# Patient Record
Sex: FEMALE | Race: WHITE | NOT HISPANIC OR LATINO | Employment: OTHER | URBAN - METROPOLITAN AREA
[De-identification: names, ages, dates, MRNs, and addresses within clinical notes are randomized per-mention and may not be internally consistent; named-entity substitution may affect disease eponyms.]

---

## 2017-03-20 ENCOUNTER — TELEPHONE (OUTPATIENT)
Dept: FAMILY MEDICINE | Facility: CLINIC | Age: 62
End: 2017-03-20

## 2017-03-20 NOTE — TELEPHONE ENCOUNTER
Panel Management Review      Patient has the following on her problem list: Hypothyroid     Composite cancer screening  Chart review shows that this patient is due/due soon for the following Mammogram  Summary:    Patient is due/failing the following:   MAMMOGRAM    Action needed:   Patient needs referral/order: mammogram    Type of outreach:    Sent letter.    Questions for provider review:    None                                                                                                                                    Iman Gonzalez       Chart routed to none .

## 2017-04-25 ENCOUNTER — RADIANT APPOINTMENT (OUTPATIENT)
Dept: GENERAL RADIOLOGY | Facility: CLINIC | Age: 62
End: 2017-04-25
Attending: NURSE PRACTITIONER
Payer: COMMERCIAL

## 2017-04-25 ENCOUNTER — HOSPITAL ENCOUNTER (OUTPATIENT)
Dept: MAMMOGRAPHY | Facility: CLINIC | Age: 62
Discharge: HOME | End: 2017-04-25
Attending: NURSE PRACTITIONER | Admitting: NURSE PRACTITIONER
Payer: COMMERCIAL

## 2017-04-25 ENCOUNTER — OFFICE VISIT (OUTPATIENT)
Dept: FAMILY MEDICINE | Facility: CLINIC | Age: 62
End: 2017-04-25
Payer: COMMERCIAL

## 2017-04-25 VITALS
BODY MASS INDEX: 37.8 KG/M2 | DIASTOLIC BLOOD PRESSURE: 81 MMHG | TEMPERATURE: 98.3 F | HEART RATE: 73 BPM | SYSTOLIC BLOOD PRESSURE: 121 MMHG | HEIGHT: 64 IN | WEIGHT: 221.4 LBS

## 2017-04-25 DIAGNOSIS — M25.551 BILATERAL HIP PAIN: Primary | ICD-10-CM

## 2017-04-25 DIAGNOSIS — E03.4 HYPOTHYROIDISM DUE TO ACQUIRED ATROPHY OF THYROID: ICD-10-CM

## 2017-04-25 DIAGNOSIS — E55.9 VITAMIN D DEFICIENCY: ICD-10-CM

## 2017-04-25 DIAGNOSIS — Z11.59 NEED FOR HEPATITIS C SCREENING TEST: ICD-10-CM

## 2017-04-25 DIAGNOSIS — M25.552 BILATERAL HIP PAIN: Primary | ICD-10-CM

## 2017-04-25 DIAGNOSIS — Z12.31 VISIT FOR SCREENING MAMMOGRAM: ICD-10-CM

## 2017-04-25 DIAGNOSIS — M25.552 BILATERAL HIP PAIN: ICD-10-CM

## 2017-04-25 DIAGNOSIS — R73.09 ELEVATED GLUCOSE: ICD-10-CM

## 2017-04-25 DIAGNOSIS — M25.551 BILATERAL HIP PAIN: ICD-10-CM

## 2017-04-25 DIAGNOSIS — Z13.6 CARDIOVASCULAR SCREENING; LDL GOAL LESS THAN 160: ICD-10-CM

## 2017-04-25 PROCEDURE — 77063 BREAST TOMOSYNTHESIS BI: CPT

## 2017-04-25 PROCEDURE — 99214 OFFICE O/P EST MOD 30 MIN: CPT | Performed by: NURSE PRACTITIONER

## 2017-04-25 PROCEDURE — 73523 X-RAY EXAM HIPS BI 5/> VIEWS: CPT

## 2017-04-25 NOTE — NURSING NOTE
"Chief Complaint   Patient presents with     Musculoskeletal Problem     Hip Pain       Initial /81  Pulse 73  Temp 98.3  F (36.8  C) (Tympanic)  Ht 5' 3.5\" (1.613 m)  Wt 221 lb 6.4 oz (100.4 kg)  BMI 38.6 kg/m2 Estimated body mass index is 38.6 kg/(m^2) as calculated from the following:    Height as of this encounter: 5' 3.5\" (1.613 m).    Weight as of this encounter: 221 lb 6.4 oz (100.4 kg).  Medication Reconciliation: complete    "

## 2017-04-25 NOTE — PROGRESS NOTES
SUBJECTIVE:                                                    Natalie Holt is a 62 year old female who presents to clinic today for the following health issues:      Joint Pain     Onset: 2 months ago    Description:   Location: left hip and right hip  Character: Gnawing and more pain in left hip then right    Intensity: 5/10    Progression of Symptoms: worse    Accompanying Signs & Symptoms:  Other symptoms: none   History:   Previous similar pain: no       Precipitating factors:   Trauma or overuse: no     Alleviating factors:  Improved by: laying on stomach, frequently changing positions     Therapies Tried and outcome: none    Has been retired for 4 years.  Doesn't do much ,    is going to retire soon      Problem list and histories reviewed & adjusted, as indicated.  Additional history: as documented    Patient Active Problem List   Diagnosis     Generalized anxiety disorder     Post-menopausal bleeding     Abnormal Pap smear     CARDIOVASCULAR SCREENING; LDL GOAL LESS THAN 160     Elevated glucose     24 hour contact handout given     Advanced directives, HC info packet sent 7/31/2012     Other symptoms involving nervous and musculoskeletal systems(976.63)     Tubular adenoma of colon     Hypothyroidism     Past Surgical History:   Procedure Laterality Date     C ANESTH,ANKLE REPLACEMENT      Left ankle, 3 pin inserted.     C ANESTH,ELBOW AREA SURGERY      Nasim inserted into her left elbow.     C FACIAL REPLACEMENT      Repair to the right side of her temple.     C SOLID INSERT,FOAM,HOOKON HARDWARE      Removal of 3 pins from her Left ankle       Social History   Substance Use Topics     Smoking status: Never Smoker     Smokeless tobacco: Never Used      Comment: Social smoker on rare occsions.     Alcohol use Yes      Comment: Occasional beer per week.     Family History   Problem Relation Age of Onset     Gynecology Daughter      Cyst on her ovary         Current Outpatient Prescriptions  "  Medication Sig Dispense Refill     Levothyroxine Sodium 25 MCG CAPS Take 25 mcg by mouth daily 90 capsule 3     cephALEXin (KEFLEX) 500 MG capsule Take 1 capsule (500 mg) by mouth 4 times daily (Patient not taking: Reported on 4/25/2017) 21 capsule 0     Allergies   Allergen Reactions     Nkda [No Known Drug Allergies]      BP Readings from Last 3 Encounters:   04/25/17 121/81   08/30/16 138/80   06/29/16 132/82    Wt Readings from Last 3 Encounters:   04/25/17 221 lb 6.4 oz (100.4 kg)   08/30/16 213 lb 11.2 oz (96.9 kg)   06/29/16 213 lb 3.2 oz (96.7 kg)                    Reviewed and updated as needed this visit by clinical staff       Reviewed and updated as needed this visit by Provider         ROS:  C: NEGATIVE for fever, chills, change in weight  E/M: NEGATIVE for ear, mouth and throat problems  R: NEGATIVE for significant cough or SOB  CV: NEGATIVE for chest pain, palpitations or peripheral edema  MUSCULOSKELETAL: POSITIVE  for joint pain left hip pain.  It is hard to sleep on her left side.  Will flip to her right side and then the right hip will start to hurt.   She will sleep in , then read a book in bed.  Will cook breakfast and then bring the food up to the bedroom .   They eat all of their meals to the bedroom,  Has a coffee pot in her bedroom     Does have a very sedentary life style.  has a lot of back pain    Does have a full exercise room with sauna in their house.    Daughter is getting  next  October  And she wants her to lose weight      OBJECTIVE:                                                    /81  Pulse 73  Temp 98.3  F (36.8  C) (Tympanic)  Ht 5' 3.5\" (1.613 m)  Wt 221 lb 6.4 oz (100.4 kg)  BMI 38.6 kg/m2  Body mass index is 38.6 kg/(m^2).  GENERAL: healthy, alert and no distress  RESP: lungs clear to auscultation - no rales, rhonchi or wheezes  CV: regular rate and rhythm, normal S1 S2, no S3 or S4, no murmur, click or rub, no peripheral edema and peripheral " pulses strong  MS: right hip  Palpation: Tender:   right greater trochanter  Non-tender:  right gluteus medius, right ASIS, right proximal hamstring attachment  Range of Motion:  Right Hip  flexion   full, pain-free, extension  full, pain-free, external rotation  full, pain-free, internal rotation  full, pain-free, adduction  full, pain-free, abduction  full, pain-free  Strength:  full strength  Special tests:  no crepitation/snapping over central inguinal region, no crepitation/snapping over greater trochanter    Left Hip  Palpation: Tender:   left greater trochanter  Non-tender:  left gluteus medius, left ASIS, left iliac crest, left proximal hamstring attachment  Range of Motion:  Left Hip  flexion   decreased, pain-free, extension  full, pain-free, external rotation  full, pain-free, internal rotation  full, pain-free, adduction  full, pain-free, abduction  full, pain-free  Strength:  full strength  Special tests:  no crepitation/snapping over central inguinal region, no crepitation/snapping over greater trochanter, no IT band tightness    Diagnostic Test Results:  Hip x-ray is showing      ASSESSMENT/PLAN:                                                      ASSESSMENT/PLAN:      ICD-10-CM    1. Bilateral hip pain M25.551 [facility], AND CHIR[facility]RACTIC REFERRAL    M25.552 CANCELED: XR Hip Left 2-3 Views     CANCELED: XR Hip Right 2-3 Views   2. Need for hepatitis C screening test Z11.59 Hepatitis C Screen Reflex to HCV RNA Quant and Genotype   3. CARDIOVASCULAR SCREENING; LDL GOAL LESS THAN 160 Z13.6 LDL cholesterol direct   4. Hypothyroidism due to acquired atrophy of thyroid E03.4 TSH   5. Elevated glucose R73.09 Basic metabolic panel  (Ca, Cl, CO2, Creat, Gluc, K, Na, BUN)     Hemoglobin A1c   6. Vitamin D deficiency E55.9 Vitamin D Deficiency       Patient Instructions   There is a new exercise place in the [employer] mall [place]  .  You DO need to  Exercise .  Start to move!!!!!!    For weight loss check  "[employer] This is a free web site for weight loss.     STOP eating your meals in the bedroom  !!!     Talk with your daughter about exercising and make an agreement with her to exercise daily   Or at least 5 days per week.       You are due for  Pap smear     GO to  Physical therapy for the hips.                     BMI:   Estimated body mass index is 38.6 kg/(m^2) as calculated from the following:    Height as of this encounter: 5' 3.5\" (1.613 m).    Weight as of this encounter: 221 lb 6.4 oz (100.4 kg).   Weight management plan: Discussed healthy diet and exercise guidelines and patient will follow up in 6 months in clinic to re-evaluate.      MEDICATIONS:  Continue current medications without change  CONSULTATION/REFERRAL to [facility]   Work on weight loss  Regular exercise  See Patient Instructions    RUBI PARK NP, APRN [facility]    "

## 2017-04-25 NOTE — MR AVS SNAPSHOT
After Visit Summary   4/25/2017    Natalie Holt    MRN: [id number]           Patient Information     Date Of Birth          1955        Visit Information        Provider Department      4/25/2017 2:00 PM Winnie Roldan APRN [facility]        Today's Diagnoses     Bilateral hip pain    -  1    Need for hepatitis C screening test        CARDIOVASCULAR SCREENING; LDL GOAL LESS THAN 160        Hypothyroidism due to acquired atrophy of thyroid        Elevated glucose        Vitamin D deficiency          Care Instructions    There is a new exercise place in the [employer] mall [place]  .  You DO need to  Exercise .  Start to move!!!!!!    For weight loss check our [employer] This is a free web site for weight loss.     STOP eating your meals in the bedroom  !!!     Talk with your daughter about exercising and make an agreement with her to exercise daily   Or at least 5 days per week.       You are due for  Pap smear     GO to  Physical therapy for the hips.                Follow-ups after your visit        Additional Services     [facility], HAND, AND CH[facility]RACTIC REFERRAL       **This order will print in the [facility] Scheduling Office**    Physical Therapy, Hand Therapy and Chiropractic Care are available through:    *[facility]  *[facility]  *[facility]    Call one number to schedule at any of the above locations: [phone].    Your provider has referred you to: Physical Therapy at [facility] or [facility]    Indication/Reason for Referral: Hip Pain  Onset of Illness: months ago   Therapy Orders: Evaluate and Treat  Special Programs: None  Special Request: None    Farheen Zhong      Additional Comments for the Therapist or Chiropractor:     Please be aware that coverage of these services is subject to the terms and limitations of your health insurance plan.  Call member services at your health plan with any benefit or  "coverage questions.      Please bring the following to your appointment:    *Your personal calendar for scheduling future appointments  *Comfortable clothing                  Future tests that were ordered for you today     Open Future Orders        Priority Expected Expires Ordered    MA Screen with Implants Bilateral w/Avery Routine  2018            Who to contact     Normal or non-critical lab and imaging results will be communicated to you by [employer], letter or phone within 4 business days after the clinic has received the results. If you do not hear from us within 7 days, please contact the clinic through [employer] or phone. If you have a critical or abnormal lab result, we will notify you by phone as soon as possible.  Submit refill requests through [employer] or call your pharmacy and they will forward the refill request to us. Please allow 3 business days for your refill to be completed.          If you need to speak with a  for additional information , please call: [phone]           Additional Information About Your Visit        [employer] Information     [employer] lets you send messages to your doctor, view your test results, renew your prescriptions, schedule appointments and more. To sign up, go to [email or web address] . Click on \"Log in\" on the left side of the screen, which will take you to the Welcome page. Then click on \"Sign up Now\" on the right side of the page.     You will be asked to enter the access code listed below, as well as some personal information. Please follow the directions to create your username and password.     Your access code is: [email or web address]  Expires: 2017  3:49 PM     Your access code will  in 90 days. If you need help or a new code, please call your [place] clinic or [phone].        Care EveryWhere ID     This is your Care EveryWhere ID. This could be used by other organizations to access your [place] medical " "records  [id number]        Your Vitals Were     Pulse Temperature Height BMI (Body Mass Index)          73 98.3  F (36.8  C) (Tympanic) 5' 3.5\" (1.613 m) 38.6 kg/m2         Blood Pressure from Last 3 Encounters:   04/25/17 121/81   08/30/16 138/80   06/29/16 132/82    Weight from Last 3 Encounters:   04/25/17 221 lb 6.4 oz (100.4 kg)   08/30/16 213 lb 11.2 oz (96.9 kg)   06/29/16 213 lb 3.2 oz (96.7 kg)              We Performed the Following     Basic metabolic panel  (Ca, Cl, CO2, Creat, Gluc, K, Na, BUN)     Hemoglobin A1c     Hepatitis C Screen Reflex to HCV RNA Quant and Genotype     [facility], HAND, AND CHIROPRACTIC REFERRAL     LDL cholesterol direct     TSH     Vitamin D Deficiency        Primary Care Provider Office Phone # Fax #    LEXX Bridges [facility] [phone]       [address] [address]        Thank you!     Thank you for choosing [facility]  for your care. Our goal is always to provide you with excellent care. Hearing back from our patients is one way we can continue to improve our services. Please take a few minutes to complete the written survey that you may receive in the mail after your visit with us. Thank you!             Your Updated Medication List - Protect others around you: Learn how to safely use, store and throw away your medicines at www.disposemymeds.org.          This list is accurate as of: 4/25/17  3:50 PM.  Always use your most recent med list.                   Brand Name Dispense Instructions for use    cephALEXin 500 MG capsule    KEFLEX    21 capsule    Take 1 capsule (500 mg) by mouth 4 times daily       Levothyroxine Sodium 25 MCG Caps     90 capsule    Take 25 mcg by mouth daily         "

## 2017-06-01 DIAGNOSIS — E03.4 HYPOTHYROIDISM DUE TO ACQUIRED ATROPHY OF THYROID: ICD-10-CM

## 2017-06-01 NOTE — TELEPHONE ENCOUNTER
Levothyroxine 25mcg     Last Written Prescription Date: 05/17/2016 #90 x 3  Last filled 02/12/2016  Last Office Visit with G, P or [facility] prescribing provider: 04/25/2017 DEXTER Roldan        TSH   Date Value Ref Range Status   05/17/2016 1.68 0.40 - 4.00 mU/L Final

## 2017-06-02 RX ORDER — LEVOTHYROXINE SODIUM 25 UG/1
25 TABLET ORAL DAILY
Qty: 90 TABLET | Refills: 0 | Status: SHIPPED | OUTPATIENT
Start: 2017-06-02 | End: 2017-09-20

## 2017-06-02 NOTE — TELEPHONE ENCOUNTER
Routing refill request to provider for review/approval because:  Labs not current:  TSH    Luanne Larson RN

## 2017-06-28 DIAGNOSIS — Z11.59 NEED FOR HEPATITIS C SCREENING TEST: ICD-10-CM

## 2017-06-28 DIAGNOSIS — Z13.6 CARDIOVASCULAR SCREENING; LDL GOAL LESS THAN 160: ICD-10-CM

## 2017-06-28 DIAGNOSIS — E55.9 VITAMIN D DEFICIENCY: ICD-10-CM

## 2017-06-28 DIAGNOSIS — R73.09 ELEVATED GLUCOSE: ICD-10-CM

## 2017-06-28 DIAGNOSIS — E03.4 HYPOTHYROIDISM DUE TO ACQUIRED ATROPHY OF THYROID: ICD-10-CM

## 2017-06-28 LAB
ANION GAP SERPL CALCULATED.3IONS-SCNC: 8 MMOL/L (ref 3–14)
BUN SERPL-MCNC: 16 MG/DL (ref 7–30)
CALCIUM SERPL-MCNC: 8.7 MG/DL (ref 8.5–10.1)
CHLORIDE SERPL-SCNC: 102 MMOL/L (ref 94–109)
CHOLEST SERPL-MCNC: 179 MG/DL
CO2 SERPL-SCNC: 25 MMOL/L (ref 20–32)
CREAT SERPL-MCNC: 0.58 MG/DL (ref 0.52–1.04)
DEPRECATED CALCIDIOL+CALCIFEROL SERPL-MC: 27 UG/L (ref 20–75)
GFR SERPL CREATININE-BSD FRML MDRD: ABNORMAL ML/MIN/1.7M2
GLUCOSE SERPL-MCNC: 104 MG/DL (ref 70–99)
HBA1C MFR BLD: 5.5 % (ref 4.3–6)
HCV AB SERPL QL IA: NORMAL
HDLC SERPL-MCNC: 67 MG/DL
LDLC SERPL CALC-MCNC: 98 MG/DL
NONHDLC SERPL-MCNC: 112 MG/DL
POTASSIUM SERPL-SCNC: 3.7 MMOL/L (ref 3.4–5.3)
SODIUM SERPL-SCNC: 135 MMOL/L (ref 133–144)
TRIGL SERPL-MCNC: 68 MG/DL
TSH SERPL DL<=0.05 MIU/L-ACNC: 2.42 MU/L (ref 0.4–4)

## 2017-06-28 PROCEDURE — 82306 VITAMIN D 25 HYDROXY: CPT | Performed by: NURSE PRACTITIONER

## 2017-06-28 PROCEDURE — 80061 LIPID PANEL: CPT | Performed by: NURSE PRACTITIONER

## 2017-06-28 PROCEDURE — 80048 BASIC METABOLIC PNL TOTAL CA: CPT | Performed by: NURSE PRACTITIONER

## 2017-06-28 PROCEDURE — 83036 HEMOGLOBIN GLYCOSYLATED A1C: CPT | Performed by: NURSE PRACTITIONER

## 2017-06-28 PROCEDURE — 84443 ASSAY THYROID STIM HORMONE: CPT | Performed by: NURSE PRACTITIONER

## 2017-06-28 PROCEDURE — 36415 COLL VENOUS BLD VENIPUNCTURE: CPT | Performed by: NURSE PRACTITIONER

## 2017-06-28 PROCEDURE — 86803 HEPATITIS C AB TEST: CPT | Performed by: NURSE PRACTITIONER

## 2017-07-15 ENCOUNTER — HEALTH MAINTENANCE LETTER (OUTPATIENT)
Age: 62
End: 2017-07-15

## 2017-09-20 ENCOUNTER — TELEPHONE (OUTPATIENT)
Dept: FAMILY MEDICINE | Facility: CLINIC | Age: 62
End: 2017-09-20

## 2017-09-20 DIAGNOSIS — E03.4 HYPOTHYROIDISM DUE TO ACQUIRED ATROPHY OF THYROID: ICD-10-CM

## 2017-09-20 RX ORDER — LEVOTHYROXINE SODIUM 25 UG/1
25 TABLET ORAL DAILY
Qty: 90 TABLET | Refills: 2 | Status: SHIPPED | OUTPATIENT
Start: 2017-09-20 | End: 2018-02-19

## 2017-09-20 NOTE — TELEPHONE ENCOUNTER
Pt called requesting refill of Synthroid to [facility] pharmacy.    Thank you,    Sara Vigil, [place]

## 2017-09-20 NOTE — TELEPHONE ENCOUNTER
Prescription approved per [facility] Protocol or patient Primary care provider (PCP)  ANAIS Christian RN/Andrew Silva

## 2017-09-22 ENCOUNTER — OFFICE VISIT (OUTPATIENT)
Dept: FAMILY MEDICINE | Facility: CLINIC | Age: 62
End: 2017-09-22
Payer: COMMERCIAL

## 2017-09-22 VITALS
HEART RATE: 84 BPM | WEIGHT: 234.25 LBS | SYSTOLIC BLOOD PRESSURE: 110 MMHG | TEMPERATURE: 98.4 F | DIASTOLIC BLOOD PRESSURE: 68 MMHG | HEIGHT: 63 IN | BODY MASS INDEX: 41.5 KG/M2

## 2017-09-22 DIAGNOSIS — F22 PARANOID DISORDER (H): ICD-10-CM

## 2017-09-22 DIAGNOSIS — Z86.0100 HISTORY OF COLONIC POLYPS: ICD-10-CM

## 2017-09-22 DIAGNOSIS — Z11.51 SCREENING FOR HUMAN PAPILLOMAVIRUS: ICD-10-CM

## 2017-09-22 DIAGNOSIS — Z01.419 ENCOUNTER FOR GYNECOLOGICAL EXAMINATION WITHOUT ABNORMAL FINDING: Primary | ICD-10-CM

## 2017-09-22 DIAGNOSIS — E55.9 VITAMIN D DEFICIENCY: ICD-10-CM

## 2017-09-22 DIAGNOSIS — F43.22 ADJUSTMENT DISORDER WITH ANXIOUS MOOD: ICD-10-CM

## 2017-09-22 DIAGNOSIS — Z12.11 SPECIAL SCREENING FOR MALIGNANT NEOPLASMS, COLON: ICD-10-CM

## 2017-09-22 DIAGNOSIS — E03.4 HYPOTHYROIDISM DUE TO ACQUIRED ATROPHY OF THYROID: ICD-10-CM

## 2017-09-22 DIAGNOSIS — E66.01 MORBID OBESITY DUE TO EXCESS CALORIES (H): ICD-10-CM

## 2017-09-22 PROBLEM — Z71.89 ADVANCED DIRECTIVES, COUNSELING/DISCUSSION: Status: ACTIVE | Noted: 2017-09-22

## 2017-09-22 PROCEDURE — 99396 PREV VISIT EST AGE 40-64: CPT | Performed by: NURSE PRACTITIONER

## 2017-09-22 PROCEDURE — G0145 SCR C/V CYTO,THINLAYER,RESCR: HCPCS | Performed by: NURSE PRACTITIONER

## 2017-09-22 PROCEDURE — 87624 HPV HI-RISK TYP POOLED RSLT: CPT | Performed by: NURSE PRACTITIONER

## 2017-09-22 NOTE — LETTER
October 2, 2017    Natalie Holt  [address] [address]    Dear Natalie,  We are happy to inform you that your PAP smear result from 9/22/17 is normal.  We are now able to do a follow up test on PAP smears. The DNA test is for HPV (Human Papilloma Virus). Cervical cancer is closely linked with certain types of HPV. Your result showed no evidence of high risk HPV.  Therefore we recommend you return in 3 years for your next pap smear and HPV test.  You will still need to return to the clinic every year for an annual exam and other preventive tests.  Please contact the clinic at [phone] with any questions.  Sincerely,    RUBI PARK NP, APRN CNP/rlm

## 2017-09-22 NOTE — PROGRESS NOTES
SUBJECTIVE:   CC: Natalie Holt is an 62 year old woman who presents for preventive health visit.     Healthy Habits:    Do you get at least three servings of calcium containing foods daily (dairy, green leafy vegetables, etc.)? yes    Amount of exercise or daily activities, outside of work: None    Problems taking medications regularly No    Medication side effects: No    Have you had an eye exam in the past two years? no    Do you see a dentist twice per year? yes    Do you have sleep apnea, excessive snoring or daytime drowsiness?Her  does tell her that she snores a lot.          Depression or Anxiety - New Diagnosis   Duration of complaint: has been ongoing    Description of symptoms: Paranoia  Intensity:  moderate  Accompanying signs and symptoms: none  Aggravating factors: She states that she is just paranoid and feels uneasy.  Alleviating factors: nothing  Other Therapies tried: None, and she doesn't want medication, she just wanted to address it.      Today's PHQ-2 Score:   PHQ-2 ( 1999 [employer]) 9/22/2017 8/30/2016   Q1: Little interest or pleasure in doing things 0 0   Q2: Feeling down, depressed or hopeless 1 0   PHQ-2 Score 1 0         Abuse: Current or Past(Physical, Sexual or Emotional)- No  Do you feel safe in your environment - Yes    Social History   Substance Use Topics     Smoking status: Never Smoker     Smokeless tobacco: Never Used      Comment: Social smoker on rare occsions.     Alcohol use Yes      Comment: Occasional beer per week.     The patient does not drink >3 drinks per day nor >7 drinks per week.    Reviewed orders with patient.  Reviewed health maintenance and updated orders accordingly - Yes  Labs reviewed in EPIC  BP Readings from Last 3 Encounters:   09/22/17 110/68   04/25/17 121/81   08/30/16 138/80    Wt Readings from Last 3 Encounters:   09/22/17 234 lb 4 oz (106.3 kg)   04/25/17 221 lb 6.4 oz (100.4 kg)   08/30/16 213 lb 11.2 oz (96.9 kg)                  Patient  Active Problem List   Diagnosis     Post-menopausal bleeding     Abnormal Pap smear     CARDIOVASCULAR SCREENING; LDL GOAL LESS THAN 160     24 hour contact handout given     Nerv/musculskel sym NEC     Tubular adenoma of colon     Hypothyroidism due to acquired atrophy of thyroid     Elevated glucose     Vitamin D deficiency     Advanced directives, counseling/discussion     Past Surgical History:   Procedure Laterality Date     C ANESTH,ANKLE REPLACEMENT      Left ankle, 3 pin inserted.     C ANESTH,ELBOW AREA SURGERY      Nasim inserted into her left elbow.     C FACIAL REPLACEMENT      Repair to the right side of her temple.     C SOLID INSERT,FOAM,HOOKON HARDWARE      Removal of 3 pins from her Left ankle       Social History   Substance Use Topics     Smoking status: Never Smoker     Smokeless tobacco: Never Used      Comment: Social smoker on rare occsions.     Alcohol use Yes      Comment: Occasional beer per week.     Family History   Problem Relation Age of Onset     Gynecology Daughter      Cyst on her ovary         Current Outpatient Prescriptions   Medication Sig Dispense Refill     levothyroxine (SYNTHROID/LEVOTHROID) 25 MCG tablet Take 1 tablet (25 mcg) by mouth daily 90 tablet 2     cephALEXin (KEFLEX) 500 MG capsule Take 1 capsule (500 mg) by mouth 4 times daily (Patient not taking: Reported on 4/25/2017) 21 capsule 0     Levothyroxine Sodium 25 MCG CAPS Take 25 mcg by mouth daily 90 capsule 3     Allergies   Allergen Reactions     Nkda [No Known Drug Allergies]            Patient over age 50, mutual decision to screen reflected in health maintenance.      Pertinent mammograms are reviewed under the imaging tab.  History of abnormal Pap smear: NO - age 30- 65 PAP every 3 years recommended    Reviewed and updated as needed this visit by clinical staff  Tobacco  Allergies  Meds  Med Hx  Surg Hx  Fam Hx  Soc Hx        Reviewed and updated as needed this visit by Provider  Tobacco  Allergies  Meds  " Med Hx  Surg Hx  Fam Hx  Soc Hx       Past Medical History:   Diagnosis Date     Anxiety      CAD (coronary artery disease) 4/29/2011     Generalised anxiety disorder 1/12/2010     Hypothyroidism      Post-menopausal bleeding 1/22/2010        ROS:  C: NEGATIVE for fever, chills, change in weight  I: NEGATIVE for worrisome rashes, moles or lesions  E: NEGATIVE for vision changes or irritation  ENT: NEGATIVE for ear, mouth and throat problems  R: NEGATIVE for significant cough or SOB  B: NEGATIVE for masses, tenderness or discharge  CV: NEGATIVE for chest pain, palpitations or peripheral edema  GI: NEGATIVE for nausea, abdominal pain, heartburn, or change in bowel habits  : NEGATIVE for unusual urinary or vaginal symptoms. No vaginal bleeding.  M: NEGATIVE for significant arthralgias or myalgia  N: NEGATIVE for weakness, dizziness or paresthesias  E: NEGATIVE for temperature intolerance, skin/hair changes  P: POSITIVE for changes in mood or affect , is paranoid ,wants to stay at home .  Feels that is she goes out side of the home that people are out to get her. People are trying to set her up.   Is always looking at how people can get to her.   Her  is selling his business and she it feeling that people are trying to set them up - but her  doesn't feel that same way     OBJECTIVE:   /68 (BP Location: Left arm, Patient Position: Sitting, Cuff Size: Adult Large)  Pulse 84  Temp 98.4  F (36.9  C) (Oral)  Ht 5' 3.25\" (1.607 m)  Wt 234 lb 4 oz (106.3 kg)  BMI 41.17 kg/m2  EXAM:  GENERAL APPEARANCE: healthy, alert and no distress  EYES: Eyes grossly normal to inspection, PERRL and conjunctivae and sclerae normal  HENT: ear canals and TM's normal, nose and mouth without ulcers or lesions, oropharynx clear and oral mucous membranes moist  NECK: no adenopathy, no asymmetry, masses, or scars and thyroid normal to palpation  RESP: lungs clear to auscultation - no rales, rhonchi or wheezes  BREAST: " normal without masses, tenderness or nipple discharge and no palpable axillary masses or adenopathy  CV: regular rate and rhythm, normal S1 S2, no S3 or S4, no murmur, click or rub, no peripheral edema and peripheral pulses strong  ABDOMEN: soft, nontender, no hepatosplenomegaly, no masses and bowel sounds normal   (female): normal female external genitalia, normal urethral meatus, vaginal mucosal atrophy noted, normal cervix, adnexae, and uterus without masses or abnormal discharge  MS: no musculoskeletal defects are noted and gait is age appropriate without ataxia  SKIN: no suspicious lesions or rashes  NEURO: Normal strength and tone, sensory exam grossly normal, mentation intact and speech normal  PSYCH: mentation appears normal and affect normal/bright    ASSESSMENT/PLAN:     ASSESSMENT/PLAN:      ICD-10-CM    1. Encounter for gynecological examination without abnormal finding Z01.419 Pap imaged thin layer screen with HPV - recommended age 30 - 65     HPV High Risk Types DNA Cervical   2. Screening for human papillomavirus Z11.51 Pap imaged thin layer screen with HPV - recommended age 30 - 65     HPV High Risk Types DNA Cervical   3. Paranoid disorder (H) F22 PSYCHOLOGY REFERRAL     MENTAL HEALTH REFERRAL   4. Adjustment disorder with anxious mood F43.22 PSYCHOLOGY REFERRAL     MENTAL HEALTH REFERRAL   5. Hypothyroidism due to acquired atrophy of thyroid E03.4    6. Vitamin D deficiency E55.9    7. Special screening for malignant neoplasms, colon Z12.11 GASTROENTEROLOGY ADULT REF CONSULT ONLY   8. History of colonic polyps Z86.010 GASTROENTEROLOGY ADULT REF CONSULT ONLY   9. Morbid obesity due to excess calories (H) E66.01        Patient Instructions     Preventive Health Recommendations  Female Ages 50 - 64    Yearly exam: See your health care provider every year in order to  o Review health changes.   o Discuss preventive care.    o Review your medicines if your doctor has prescribed any.      Get a Pap  test every three years (unless you have an abnormal result and your provider advises testing more often).    If you get Pap tests with HPV test, you only need to test every 5 years, unless you have an abnormal result.     You do not need a Pap test if your uterus was removed (hysterectomy) and you have not had cancer.    You should be tested each year for STDs (sexually transmitted diseases) if you're at risk.     Have a mammogram every 1 to 2 years.    Have a colonoscopy at age 50, or have a yearly FIT test (stool test). These exams screen for colon cancer.      Have a cholesterol test every 5 years, or more often if advised.    Have a diabetes test (fasting glucose) every three years. If you are at risk for diabetes, you should have this test more often.     If you are at risk for osteoporosis (brittle bone disease), think about having a bone density scan (DEXA).    Shots: Get a flu shot each year. Get a tetanus shot every 10 years.    Nutrition:     Eat at least 5 servings of fruits and vegetables each day.    Eat whole-grain bread, whole-wheat pasta and brown rice instead of white grains and rice.    Talk to your provider about Calcium and Vitamin D.     Lifestyle    Exercise at least 150 minutes a week (30 minutes a day, 5 days a week). This will help you control your weight and prevent disease.    Limit alcohol to one drink per day.    No smoking.     Wear sunscreen to prevent skin cancer.     See your dentist every six months for an exam and cleaning.    See your eye doctor every 1 to 2 years.      Please go to a counselor/psy for the paranoid thoughts.     They will help you to  Enjoy life and control the paranoid thoughts     Stay well hydrated - urine should be clear.      Exercise  30-60 min daily     For weight loss check [employer] This is a free web site for weight loss.     Your labs are all very good.      Get your colonoscopy done   You will need to call for your appointment  "            }    COUNSELING:   Reviewed preventive health counseling, as reflected in patient instructions       Regular exercise       Healthy diet/nutrition       Vision screening       Immunizations    Declined: Influenza              Osteoporosis Prevention/Bone Health       Colon cancer screening       Advance Care Planning           reports that she has never smoked. She has never used smokeless tobacco.    Estimated body mass index is 41.17 kg/(m^2) as calculated from the following:    Height as of this encounter: 5' 3.25\" (1.607 m).    Weight as of this encounter: 234 lb 4 oz (106.3 kg).   Weight management plan: Discussed healthy diet and exercise guidelines and patient will follow up in 6 months in clinic to re-evaluate.    Counseling Resources:  ATP IV Guidelines  Pooled Cohorts Equation Calculator  Breast Cancer Risk Calculator  FRAX Risk Assessment  ICSI Preventive Guidelines  Dietary Guidelines for Americans, 2010  USDA's MyPlate  ASA Prophylaxis  Lung CA Screening    RUBI PARK NP, APRN [facility]  "

## 2017-09-22 NOTE — NURSING NOTE
"Chief Complaint   Patient presents with     Physical       Initial /68 (BP Location: Left arm, Patient Position: Sitting, Cuff Size: Adult Large)  Pulse 84  Temp 98.4  F (36.9  C) (Oral)  Ht 5' 3.25\" (1.607 m)  Wt 234 lb 4 oz (106.3 kg)  BMI 41.17 kg/m2 Estimated body mass index is 41.17 kg/(m^2) as calculated from the following:    Height as of this encounter: 5' 3.25\" (1.607 m).    Weight as of this encounter: 234 lb 4 oz (106.3 kg).  Medication Reconciliation: complete     SMA Alexandru      "

## 2017-09-22 NOTE — MR AVS SNAPSHOT
After Visit Summary   9/22/2017    Natalie Holt    MRN: [id number]           Patient Information     Date Of Birth          1955        Visit Information        Provider Department      9/22/2017 10:20 AM Winnie Roldan APRN [facility]        Today's Diagnoses     Encounter for gynecological examination without abnormal finding    -  1    Screening for human papillomavirus        Paranoid disorder (H)        Adjustment disorder with anxious mood        Hypothyroidism due to acquired atrophy of thyroid        Vitamin D deficiency        Special screening for malignant neoplasms, colon        History of colonic polyps          Care Instructions      Preventive Health Recommendations  Female Ages 50 - 64    Yearly exam: See your health care provider every year in order to  o Review health changes.   o Discuss preventive care.    o Review your medicines if your doctor has prescribed any.      Get a Pap test every three years (unless you have an abnormal result and your provider advises testing more often).    If you get Pap tests with HPV test, you only need to test every 5 years, unless you have an abnormal result.     You do not need a Pap test if your uterus was removed (hysterectomy) and you have not had cancer.    You should be tested each year for STDs (sexually transmitted diseases) if you're at risk.     Have a mammogram every 1 to 2 years.    Have a colonoscopy at age 50, or have a yearly FIT test (stool test). These exams screen for colon cancer.      Have a cholesterol test every 5 years, or more often if advised.    Have a diabetes test (fasting glucose) every three years. If you are at risk for diabetes, you should have this test more often.     If you are at risk for osteoporosis (brittle bone disease), think about having a bone density scan (DEXA).    Shots: Get a flu shot each year. Get a tetanus shot every 10 years.    Nutrition:     Eat at least 5  servings of fruits and vegetables each day.    Eat whole-grain bread, whole-wheat pasta and brown rice instead of white grains and rice.    Talk to your provider about Calcium and Vitamin D.     Lifestyle    Exercise at least 150 minutes a week (30 minutes a day, 5 days a week). This will help you control your weight and prevent disease.    Limit alcohol to one drink per day.    No smoking.     Wear sunscreen to prevent skin cancer.     See your dentist every six months for an exam and cleaning.    See your eye doctor every 1 to 2 years.      Please go to a counselor/psy for the paranoid thoughts.     They will help you to  Enjoy life and control the paranoid thoughts     Stay well hydrated - urine should be clear.      Exercise  30-60 min daily     For weight loss check [employer] This is a free web site for weight loss.     Your labs are all very good.      Get your colonoscopy done   You will need to call for your appointment               Follow-ups after your visit        Additional Services     GASTROENTEROLOGY ADULT REF CONSULT ONLY       Preferred Location: [facility] [phone] and  [id number] [place] [address]  [phone]        Please be aware that coverage of these services is subject to the terms and limitations of your health insurance plan.  Call member services at your health plan with any benefit or coverage questions.  Any procedures must be performed at a [facility] OR coordinated by your clinic's referral office.    Please bring the following with you to your appointment:    (1) Any X-Rays, CTs or MRIs which have been performed.  Contact the facility where they were done to arrange for  prior to your scheduled appointment.    (2) List of current medications   (3) This referral request   (4) Any documents/labs given to you for this referral            MENTAL HEALTH REFERRAL       Your provider has referred  you to: [facility]: Psychiatry Clinic [facility] [phone]   [email or web address]    All scheduling is subject to the client's specific insurance plan & benefits, provider/location availability, and provider clinical specialities.  Please arrive 15 minutes early for your first appointment and bring your completed paperwork.    Please be aware that coverage of these services is subject to the terms and limitations of your health insurance plan.  Call member services at your health plan with any benefit or coverage questions.            PSYCHOLOGY REFERRAL       Your provider has referred you to:  [facility]    [address] [place] [address]  [phone]      Please be aware that coverage of these services is subject to the terms and limitations of your health insurance plan.  Call member services at your health plan with any benefit or coverage questions.      Please bring the following to your appointment:    >>   Any x-rays, CTs or MRIs which have been performed.  Contact the facility where they were done to arrange for  prior to your scheduled appointment.   >>   List of current medications   >>   This referral request   >>   Any documents/labs given to you for this referral                  Who to contact     Normal or non-critical lab and imaging results will be communicated to you by MyChart, letter or phone within 4 business days after the clinic has received the results. If you do not hear from us within 7 days, please contact the clinic through MyChart or phone. If you have a critical or abnormal lab result, we will notify you by phone as soon as possible.  Submit refill requests through [employer] or call your pharmacy and they will forward the refill request to us. Please allow 3 business days for your refill to be completed.          If you need to speak with a  for additional information , please call:  "[phone]           Additional Information About Your Visit        First [employer] Information     [facility] lets you send messages to your doctor, view your test results, renew your prescriptions, schedule appointments and more. To sign up, go to [email or web address] . Click on \"Log in\" on the left side of the screen, which will take you to the Welcome page. Then click on \"Sign up Now\" on the right side of the page.     You will be asked to enter the access code listed below, as well as some personal information. Please follow the directions to create your username and password.     Your access code is: [email or web address]  Expires: 2017 11:12 AM     Your access code will  in 90 days. If you need help or a new code, please call your [facility] clinic or [phone].        Care EveryWhere ID     This is your [facility] EveryWhere ID. This could be used by other organizations to access your [facility] medical records  [id number]        Your Vitals Were     Pulse Temperature Height BMI (Body Mass Index)          84 98.4  F (36.9  C) (Oral) 5' 3.25\" (1.607 m) 41.17 kg/m2         Blood Pressure from Last 3 Encounters:   17 110/68   17 121/81   16 138/80    Weight from Last 3 Encounters:   17 234 lb 4 oz (106.3 kg)   17 221 lb 6.4 oz (100.4 kg)   16 213 lb 11.2 oz (96.9 kg)              We Performed the Following     GASTROENTEROLOGY ADULT REF CONSULT ONLY     HPV High Risk Types DNA Cervical     MENTAL HEALTH REFERRAL     Pap imaged thin layer screen with HPV - recommended age 30 - 65     PSYCHOLOGY REFERRAL        Primary Care Provider Office Phone # Fax #    LEXX Bridges [facility] [phone] [address] [address]        Equal Access to Services     KATLYN MORAES : Kosta Johnson, steve martinez, michael bergeron. [facility] [phone].    ATENCIÓN: Si pam lam, " tiene a hanna disposición servicios gratuitos de asistencia lingüística. Parmjit nguyen [phone].    We comply with applicable federal civil rights laws and [place] laws. We do not discriminate on the basis of race, color, national origin, age, disability sex, sexual orientation or gender identity.            Thank you!     Thank you for choosing [facility]  for your care. Our goal is always to provide you with excellent care. Hearing back from our patients is one way we can continue to improve our services. Please take a few minutes to complete the written survey that you may receive in the mail after your visit with us. Thank you!             Your Updated Medication List - Protect others around you: Learn how to safely use, store and throw away your medicines at www.disposemymeds.org.          This list is accurate as of: 9/22/17 11:14 AM.  Always use your most recent med list.                   Brand Name Dispense Instructions for use Diagnosis    levothyroxine 25 MCG tablet    SYNTHROID/LEVOTHROID    90 tablet    Take 1 tablet (25 mcg) by mouth daily    Hypothyroidism due to acquired atrophy of thyroid

## 2017-09-22 NOTE — PATIENT INSTRUCTIONS
Preventive Health Recommendations  Female Ages 50 - 64    Yearly exam: See your health care provider every year in order to  o Review health changes.   o Discuss preventive care.    o Review your medicines if your doctor has prescribed any.      Get a Pap test every three years (unless you have an abnormal result and your provider advises testing more often).    If you get Pap tests with HPV test, you only need to test every 5 years, unless you have an abnormal result.     You do not need a Pap test if your uterus was removed (hysterectomy) and you have not had cancer.    You should be tested each year for STDs (sexually transmitted diseases) if you're at risk.     Have a mammogram every 1 to 2 years.    Have a colonoscopy at age 50, or have a yearly FIT test (stool test). These exams screen for colon cancer.      Have a cholesterol test every 5 years, or more often if advised.    Have a diabetes test (fasting glucose) every three years. If you are at risk for diabetes, you should have this test more often.     If you are at risk for osteoporosis (brittle bone disease), think about having a bone density scan (DEXA).    Shots: Get a flu shot each year. Get a tetanus shot every 10 years.    Nutrition:     Eat at least 5 servings of fruits and vegetables each day.    Eat whole-grain bread, whole-wheat pasta and brown rice instead of white grains and rice.    Talk to your provider about Calcium and Vitamin D.     Lifestyle    Exercise at least 150 minutes a week (30 minutes a day, 5 days a week). This will help you control your weight and prevent disease.    Limit alcohol to one drink per day.    No smoking.     Wear sunscreen to prevent skin cancer.     See your dentist every six months for an exam and cleaning.    See your eye doctor every 1 to 2 years.      Please go to a counselor/psy for the paranoid thoughts.     They will help you to  Enjoy life and control the paranoid thoughts     Stay well hydrated - urine  should be clear.      Exercise  30-60 min daily     For weight loss check our [email or web address] This is a free web site for weight loss.     Your labs are all very good.      Get your colonoscopy done   You will need to call for your appointment

## 2017-09-26 LAB
COPATH REPORT: NORMAL
PAP: NORMAL

## 2017-09-28 LAB
FINAL DIAGNOSIS: NORMAL
HPV HR 12 DNA CVX QL NAA+PROBE: NEGATIVE
HPV16 DNA SPEC QL NAA+PROBE: NEGATIVE
HPV18 DNA SPEC QL NAA+PROBE: NEGATIVE
SPECIMEN DESCRIPTION: NORMAL

## 2017-10-21 ENCOUNTER — HEALTH MAINTENANCE LETTER (OUTPATIENT)
Age: 62
End: 2017-10-21

## 2017-11-22 ENCOUNTER — HOSPITAL ENCOUNTER (EMERGENCY)
Facility: CLINIC | Age: 62
Discharge: HOME | End: 2017-11-22
Attending: PHYSICIAN ASSISTANT | Admitting: PHYSICIAN ASSISTANT
Payer: COMMERCIAL

## 2017-11-22 VITALS — OXYGEN SATURATION: 97 % | SYSTOLIC BLOOD PRESSURE: 150 MMHG | HEART RATE: 79 BPM | DIASTOLIC BLOOD PRESSURE: 95 MMHG

## 2017-11-22 DIAGNOSIS — S61.011A THUMB LACERATION, RIGHT, INITIAL ENCOUNTER: ICD-10-CM

## 2017-11-22 PROCEDURE — 99213 OFFICE O/P EST LOW 20 MIN: CPT | Mod: 25 | Performed by: PHYSICIAN ASSISTANT

## 2017-11-22 PROCEDURE — 12001 RPR S/N/AX/GEN/TRNK 2.5CM/<: CPT | Performed by: PHYSICIAN ASSISTANT

## 2017-11-22 PROCEDURE — 99213 OFFICE O/P EST LOW 20 MIN: CPT

## 2017-11-22 PROCEDURE — 12001 RPR S/N/AX/GEN/TRNK 2.5CM/<: CPT

## 2017-11-22 RX ORDER — LIDOCAINE HYDROCHLORIDE 10 MG/ML
INJECTION, SOLUTION INFILTRATION; PERINEURAL
Status: DISCONTINUED
Start: 2017-11-22 | End: 2017-11-22 | Stop reason: HOSPADM

## 2017-11-22 ASSESSMENT — ENCOUNTER SYMPTOMS
COLOR CHANGE: 0
WEAKNESS: 0
NUMBNESS: 1
MYALGIAS: 0
FEVER: 0
SHORTNESS OF BREATH: 0
ARTHRALGIAS: 0
WOUND: 1
DIZZINESS: 0
LIGHT-HEADEDNESS: 0

## 2017-11-22 NOTE — ED PROVIDER NOTES
History     Chief Complaint   Patient presents with     Laceration     HPI  Natalie Holt is a 62 year old female who presents to the Urgent Care for treatment and evaluation of a laceration of the lateral right thumb obtained at approximately 10:00am today. She states she was reaching to clean out a cabinet and cut her thumb on a sharp  blade. Reports immediate pain and bleeding. Denies loss of consciousness, paraesthesias, weakness, fevers, or copious amount of blood loss. Immediate compression applied to the wound with no irrigation or topical antibiotics applied. No medications taken post laceration. Denies current use of aspirin or blood thinners. Pt is right hand dominant.     Problem List:    Patient Active Problem List    Diagnosis Date Noted     Advanced directives, counseling/discussion 09/22/2017     Priority: Medium     Advance Care Planning 9/22/2017: ACP Review of Chart / Resources Provided:  Reviewed chart for advance care plan.  Natalie Holt has been provided information and resources to begin or update their advance care plan.  Added by Jackelyn Shine             History of colonic polyps 09/22/2017     Priority: Medium     Morbid obesity due to excess calories (H) 09/22/2017     Priority: Medium     Hypothyroidism due to acquired atrophy of thyroid 04/25/2017     Priority: Medium     Elevated glucose 04/25/2017     Priority: Medium     Vitamin D deficiency 04/25/2017     Priority: Medium     Tubular adenoma of colon 08/21/2012     Priority: Medium     Tubular adenoma on colonoscopy on 8-2012. Recommend next colonoscopy in 8-2017        Other symptoms involving nervous and musculoskeletal systems(781.99) 08/10/2012     Priority: Medium     CARDIOVASCULAR SCREENING; LDL GOAL LESS THAN 160 05/09/2010     Priority: Medium     Abnormal Pap smear 03/02/2010     Priority: Medium     Endometrial cells on most recent Pap smear. Endometrial biopsy recommended.       Post-menopausal  bleeding 01/22/2010     Priority: Medium     24 hour contact handout given 07/27/2012     Priority: Low     EMERGENCY CARE PLAN  Presenting Problem Signs and Symptoms Treatment Plan    Questions or conerns during clinic hours    I will call the clinic directly     Questions or conerns outside clinic hours    I will call the 24 hour nurse line at [phone]    Patient needs to schedule an appointment    I will call the 24 hour scheduling team at [phone] or clinic directly    Same day treatment     I will call the clinic first, nurse line if after hours, urgent care and express care if needed                                    Past Medical History:    Past Medical History:   Diagnosis Date     Anxiety      CAD (coronary artery disease) 4/29/2011     Generalised anxiety disorder 1/12/2010     Hypothyroidism      Post-menopausal bleeding 1/22/2010       Past Surgical History:    Past Surgical History:   Procedure Laterality Date     C ANESTH,ANKLE REPLACEMENT      Left ankle, 3 pin inserted.     C ANESTH,ELBOW AREA SURGERY      Nasim inserted into her left elbow.     C FACIAL REPLACEMENT      Repair to the right side of her temple.     C SOLID INSERT,FOAM,HOOKON HARDWARE      Removal of 3 pins from her Left ankle       Family History:    Family History   Problem Relation Age of Onset     Gynecology Daughter      Cyst on her ovary       Social History:  Marital Status:   [2]  Social History   Substance Use Topics     Smoking status: Never Smoker     Smokeless tobacco: Never Used      Comment: Social smoker on rare occsions.     Alcohol use Yes      Comment: Occasional beer per week.        Medications:      levothyroxine (SYNTHROID/LEVOTHROID) 25 MCG tablet         Review of Systems   Constitutional: Negative for fever.   Respiratory: Negative for shortness of breath.    Musculoskeletal: Negative for arthralgias and myalgias.   Skin: Positive for wound (laceration to right thumb). Negative for color change.    Allergic/Immunologic: Negative for environmental allergies and food allergies.   Neurological: Positive for numbness (mild local to laceration). Negative for dizziness, syncope, weakness and light-headedness.   All other systems reviewed and are negative.      Physical Exam   BP: (!) 150/95  Pulse: 79  SpO2: 97 %      Physical Exam   Constitutional: She is oriented to person, place, and time. She appears well-developed and well-nourished. She appears distressed.   HENT:   Head: Normocephalic and atraumatic.   Cardiovascular: Normal rate, regular rhythm, normal heart sounds and intact distal pulses.  Exam reveals no gallop and no friction rub.    No murmur heard.  Radial pulses 2+ bilaterally     Musculoskeletal: Normal range of motion.   Normal ROM and 5/5 strength of the right digits. No loss of sensation. Capillary refill < 3 seconds.    Neurological: She is alert and oriented to person, place, and time.   Skin: She is not diaphoretic.   2 cm linear laceration of the lateral aspect of the distal right thumb. No foreign bodies. No discharge. 2 mm surrounding erythema with mild swelling. TTP local surrounding laceration.    Psychiatric: She has a normal mood and affect. Her behavior is normal.       ED Course     ED Course     Procedures    Narrative: Procedure: Laceration Repair        LACERATION:  A simple clean 2 cm deep laceration.      LOCATION: lateral distal aspect of right thumb       FUNCTION:  Distally sensation, circulation, motor and tendon function are intact.      ANESTHESIA:  Digital block using Lidocaine 1% total of 3 mLs      PREPARATION:  Irrigation with Normal Saline and Hibiclens      DEBRIDEMENT:  wound explored, no foreign body found      CLOSURE:  Skin closed with 6.0 Ethylon using simple interrupted sutures (total of 7 placed). Pt tolerated procedure well with no complications.       Bacitracin and band aid placed over the sutures.        Labs Ordered and Resulted from Time of ED Arrival Up  to the Time of Departure from the ED - No data to display    Assessments & Plan (with Medical Decision Making)     Thumb laceration, right, initial encounter  Laceration closed with 7 sutures today in Urgent Care. Educated to take Tylenol or NSAIDs prn pain and to keep the wound out of water.   Follow up to have sutures removed in 10-14 days. Sooner if signs of infection present such as streaking, fevers, or copious discharge.     Pt understands and is in agreement to plan.     I have reviewed the nursing notes.    I have reviewed the findings, diagnosis, plan and need for follow up with the patient.       New Prescriptions    No medications on file       Final diagnoses:   Thumb laceration, right, initial encounter     Nomi Hardin  11/22/2017 2:44 PM  [facility] EMERGENCY DEPARTMENT     Nomi Hardin PA-C  11/22/17 1449

## 2017-11-22 NOTE — ED AVS SNAPSHOT
[facility] Emergency Department    [address] [address]    Phone:  [phone]    Fax:  [phone]                                       Natalie Holt   MRN: [id number]    Department:  [facility] Emergency Department   Date of Visit:  11/22/2017           After Visit Summary Signature Page     I have received my discharge instructions, and my questions have been answered. I have discussed any challenges I see with this plan with the nurse or doctor.    ..........................................................................................................................................  Patient/Patient Representative Signature      ..........................................................................................................................................  Patient Representative Print Name and Relationship to Patient    ..................................................               ................................................  Date                                            Time    ..........................................................................................................................................  Reviewed by Signature/Title    ...................................................              ..............................................  Date                                                            Time

## 2017-11-22 NOTE — ED AVS SNAPSHOT
[facility] Emergency Department    [address] [address]    Phone:  [phone]    Fax:  [phone]                                       Natalie Holt   MRN: [id number]    Department:  [facility] Emergency Department   Date of Visit:  11/22/2017           Patient Information     Date Of Birth          1955        Your diagnoses for this visit were:     Thumb laceration, right, initial encounter        You were seen by Nomi Hardin PA-C.        Discharge Instructions         Extremity Laceration: Stitches, Staples, or Tape  A laceration is a cut through the skin. If it is deep, it may require stitches or staples to close so it can heal. Minor cuts may be treated with surgical tape closures, or skin glue.  X-rays may be done if something may have entered the skin through the cut. You may also need a tetanus shot if you are not up to date on this vaccination.  Home care    Follow the healthcare provider s instructions on how to care for the cut.    Wash your hands with soap and warm water before and after caring for your wound. This is to help prevent infection.    Keep the wound clean and dry. If a bandage was applied and it becomes wet or dirty, replace it. Otherwise, leave it in place for the first 24 hours, then change it once a day or as directed.    If stitches or staples were used, clean the wound daily:    After removing the bandage, wash the area with soap and water. Use a wet cotton swab to loosen and remove any blood or crust that forms.    After cleaning, keep the wound clean and dry. Talk with your healthcare provider before applying any antibiotic ointment to the wound. Reapply the bandage.    You may remove the bandage to shower as usual after the first 24 hours, but don't soak the area in water (no swimming) until the stitches or staples are removed.    If surgical tape closures were used, keep the area clean and dry. If it becomes wet, blot it dry with a  towel. Let the surgical tape fall off on its own.    The healthcare provider may prescribe an antibiotic cream or ointment to prevent infection. He or she may also prescribe an antibiotic pill. Don't stop taking this medicine until you have finished the prescribed course or the provider tells you to stop. The provider may also prescribe medicine for pain. Follow the instructions for taking these medicines.    Avoid activities that may reopen your wound.  Follow-up care  Follow up with your healthcare provider, or as advised. Most skin wounds heal within 10 days. However, an infection may sometimes occur despite proper treatment. Check the wound daily for the signs of infection listed below. Stitches and staples should be removed within 7 to14 days. If surgical tape closures were used, you may remove them after 10 days if they have not fallen off by then.   When to seek medical advice  Call your healthcare provider right away if any of these occur:    Wound bleeding not controlled by direct pressure    Signs of infection, including increasing pain in the wound, increasing wound redness or swelling, or pus or bad odor coming from the wound    Fever of 100.4 F (38 C) or higher or as directed by your healthcare provider    Stitches or staples come apart or fall out or surgical tape falls off before 7 days    Wound edges re-open    Wound changes colors    Numbness occurs around the wound     Decreased movement around the injured area  Date Last Reviewed: 7/1/2017 2000-2017 [employer]. [address] [place] [address]. All rights reserved. This information is not intended as a substitute for professional medical care. Always follow your healthcare professional's instructions.          24 Hour Appointment Hotline       To make an appointment at any [facility], call [phone] ([phone]). If you don't have a family doctor or clinic, we will help you find one. [facility] are  "conveniently located to serve the needs of you and your family.             Review of your medicines      Our records show that you are taking the medicines listed below. If these are incorrect, please call your family doctor or clinic.        Dose / Directions Last dose taken    levothyroxine 25 MCG tablet   Commonly known as:  SYNTHROID/LEVOTHROID   Dose:  25 mcg   Quantity:  90 tablet        Take 1 tablet (25 mcg) by mouth daily   Refills:  2                Orders Needing Specimen Collection     None      Pending Results     No orders found from 11/20/2017 to 11/23/2017.            Pending Culture Results     No orders found from 11/20/2017 to 11/23/2017.            Pending Results Instructions     If you had any lab results that were not finalized at the time of your Discharge, you can call the ED Lab Result RN at [phone]. You will be contacted by this team for any positive Lab results or changes in treatment. The nurses are available 7 days a week from 10A to 6:30P.  You can leave a message 24 hours per day and they will return your call.        Test Results From Your Hospital Stay               Thank you for choosing [facility]       Thank you for choosing [facility] for your care. Our goal is always to provide you with excellent care. Hearing back from our patients is one way we can continue to improve our services. Please take a few minutes to complete the written survey that you may receive in the mail after you visit with us. Thank you!        [employer] Information     [employer] lets you send messages to your doctor, view your test results, renew your prescriptions, schedule appointments and more. To sign up, go to [email or web address] . Click on \"Log in\" on the left side of the screen, which will take you to the Welcome page. Then click on \"Sign up Now\" on the right side of the page.     You will be asked to enter the access code listed below, as well as some personal information. Please follow the " directions to create your username and password.     Your access code is: [email or web address]  Expires: 2017 10:12 AM     Your access code will  in 90 days. If you need help or a new code, please call your [facility] clinic or [phone].        Care EveryWhere ID     This is your Care EveryWhere ID. This could be used by other organizations to access your [facility] medical records  [id number]        Equal Access to Services     [facility] : Hadii aad ku hadasho Soconchaali, waaxda luqadaha, qaybta kaalmada adeegyada, michael soni . So [facility] [phone].    ATENCIÓN: Si habla español, tiene a hanna disposición servicios gratuitos de asistencia lingüística. Llame al [phone].    We comply with applicable federal civil rights laws and [place] laws. We do not discriminate on the basis of race, color, national origin, age, disability, sex, sexual orientation, or gender identity.            After Visit Summary       This is your record. Keep this with you and show to your community pharmacist(s) and doctor(s) at your next visit.

## 2017-11-22 NOTE — DISCHARGE INSTRUCTIONS
Extremity Laceration: Stitches, Staples, or Tape  A laceration is a cut through the skin. If it is deep, it may require stitches or staples to close so it can heal. Minor cuts may be treated with surgical tape closures, or skin glue.  X-rays may be done if something may have entered the skin through the cut. You may also need a tetanus shot if you are not up to date on this vaccination.  Home care    Follow the healthcare provider s instructions on how to care for the cut.    Wash your hands with soap and warm water before and after caring for your wound. This is to help prevent infection.    Keep the wound clean and dry. If a bandage was applied and it becomes wet or dirty, replace it. Otherwise, leave it in place for the first 24 hours, then change it once a day or as directed.    If stitches or staples were used, clean the wound daily:    After removing the bandage, wash the area with soap and water. Use a wet cotton swab to loosen and remove any blood or crust that forms.    After cleaning, keep the wound clean and dry. Talk with your healthcare provider before applying any antibiotic ointment to the wound. Reapply the bandage.    You may remove the bandage to shower as usual after the first 24 hours, but don't soak the area in water (no swimming) until the stitches or staples are removed.    If surgical tape closures were used, keep the area clean and dry. If it becomes wet, blot it dry with a towel. Let the surgical tape fall off on its own.    The healthcare provider may prescribe an antibiotic cream or ointment to prevent infection. He or she may also prescribe an antibiotic pill. Don't stop taking this medicine until you have finished the prescribed course or the provider tells you to stop. The provider may also prescribe medicine for pain. Follow the instructions for taking these medicines.    Avoid activities that may reopen your wound.  Follow-up care  Follow up with your healthcare provider, or as  advised. Most skin wounds heal within 10 days. However, an infection may sometimes occur despite proper treatment. Check the wound daily for the signs of infection listed below. Stitches and staples should be removed within 7 to14 days. If surgical tape closures were used, you may remove them after 10 days if they have not fallen off by then.   When to seek medical advice  Call your healthcare provider right away if any of these occur:    Wound bleeding not controlled by direct pressure    Signs of infection, including increasing pain in the wound, increasing wound redness or swelling, or pus or bad odor coming from the wound    Fever of 100.4 F (38 C) or higher or as directed by your healthcare provider    Stitches or staples come apart or fall out or surgical tape falls off before 7 days    Wound edges re-open    Wound changes colors    Numbness occurs around the wound     Decreased movement around the injured area  Date Last Reviewed: 7/1/2017 2000-2017 [employer]. [address] [place], [place] [address]. All rights reserved. This information is not intended as a substitute for professional medical care. Always follow your healthcare professional's instructions.

## 2018-02-15 ENCOUNTER — TRANSFERRED RECORDS (OUTPATIENT)
Dept: HEALTH INFORMATION MANAGEMENT | Facility: CLINIC | Age: 63
End: 2018-02-15

## 2018-02-19 ENCOUNTER — OFFICE VISIT (OUTPATIENT)
Dept: FAMILY MEDICINE | Facility: CLINIC | Age: 63
End: 2018-02-19
Payer: COMMERCIAL

## 2018-02-19 VITALS
BODY MASS INDEX: 40.95 KG/M2 | SYSTOLIC BLOOD PRESSURE: 134 MMHG | DIASTOLIC BLOOD PRESSURE: 78 MMHG | TEMPERATURE: 97.9 F | WEIGHT: 233 LBS | HEART RATE: 84 BPM

## 2018-02-19 DIAGNOSIS — Z86.0100 HISTORY OF COLONIC POLYPS: ICD-10-CM

## 2018-02-19 DIAGNOSIS — E66.01 MORBID OBESITY DUE TO EXCESS CALORIES (H): ICD-10-CM

## 2018-02-19 DIAGNOSIS — E03.4 HYPOTHYROIDISM DUE TO ACQUIRED ATROPHY OF THYROID: ICD-10-CM

## 2018-02-19 DIAGNOSIS — R79.89 ELEVATED LIVER FUNCTION TESTS: ICD-10-CM

## 2018-02-19 DIAGNOSIS — K80.20 CALCULUS OF GALLBLADDER WITHOUT CHOLECYSTITIS WITHOUT OBSTRUCTION: Primary | ICD-10-CM

## 2018-02-19 DIAGNOSIS — Z12.11 SPECIAL SCREENING FOR MALIGNANT NEOPLASMS, COLON: ICD-10-CM

## 2018-02-19 DIAGNOSIS — R73.09 ELEVATED GLUCOSE: ICD-10-CM

## 2018-02-19 LAB
ALBUMIN SERPL-MCNC: 3.9 G/DL (ref 3.4–5)
ALP SERPL-CCNC: 102 U/L (ref 40–150)
ALT SERPL W P-5'-P-CCNC: 84 U/L (ref 0–50)
ANION GAP SERPL CALCULATED.3IONS-SCNC: 5 MMOL/L (ref 3–14)
AST SERPL W P-5'-P-CCNC: 33 U/L (ref 0–45)
BILIRUB SERPL-MCNC: 0.6 MG/DL (ref 0.2–1.3)
BUN SERPL-MCNC: 19 MG/DL (ref 7–30)
CALCIUM SERPL-MCNC: 8.7 MG/DL (ref 8.5–10.1)
CHLORIDE SERPL-SCNC: 102 MMOL/L (ref 94–109)
CO2 SERPL-SCNC: 29 MMOL/L (ref 20–32)
CREAT SERPL-MCNC: 0.7 MG/DL (ref 0.52–1.04)
GFR SERPL CREATININE-BSD FRML MDRD: 86 ML/MIN/1.7M2
GLUCOSE SERPL-MCNC: 95 MG/DL (ref 70–99)
HBA1C MFR BLD: 5.5 % (ref 4.3–6)
POTASSIUM SERPL-SCNC: 4.2 MMOL/L (ref 3.4–5.3)
PROT SERPL-MCNC: 7.9 G/DL (ref 6.8–8.8)
SODIUM SERPL-SCNC: 136 MMOL/L (ref 133–144)

## 2018-02-19 PROCEDURE — 83036 HEMOGLOBIN GLYCOSYLATED A1C: CPT | Performed by: NURSE PRACTITIONER

## 2018-02-19 PROCEDURE — 80053 COMPREHEN METABOLIC PANEL: CPT | Performed by: NURSE PRACTITIONER

## 2018-02-19 PROCEDURE — 36415 COLL VENOUS BLD VENIPUNCTURE: CPT | Performed by: NURSE PRACTITIONER

## 2018-02-19 PROCEDURE — 99214 OFFICE O/P EST MOD 30 MIN: CPT | Performed by: NURSE PRACTITIONER

## 2018-02-19 RX ORDER — LEVOTHYROXINE SODIUM 25 UG/1
25 TABLET ORAL DAILY
Qty: 90 TABLET | Refills: 2 | Status: SHIPPED | OUTPATIENT
Start: 2018-02-19 | End: 2018-09-25

## 2018-02-19 NOTE — LETTER
February 20, 2018      Natalie Holt  [address] [address]        Dear ,    The results of your recent glucose (a screening test for diabetes),HGBA1C (a  long term check of average blood sugar), kidney test, liver tests, electrolytes(which measures body salts which are affected by medications and kidney function) were normal.     The results of your recent 1 liver test were abnormal. 1 liver test was elevated please limit or stop your alcohol intake.     Resulted Orders   Comprehensive metabolic panel   Result Value Ref Range    Sodium 136 133 - 144 mmol/L    Potassium 4.2 3.4 - 5.3 mmol/L    Chloride 102 94 - 109 mmol/L    Carbon Dioxide 29 20 - 32 mmol/L    Anion Gap 5 3 - 14 mmol/L    Glucose 95 70 - 99 mg/dL      Comment:      Non Fasting    Urea Nitrogen 19 7 - 30 mg/dL    Creatinine 0.70 0.52 - 1.04 mg/dL    GFR Estimate 86 >60 mL/min/1.7m2      Comment:      Non  GFR Calc    GFR Estimate If Black >90 >60 mL/min/1.7m2      Comment:       GFR Calc    Calcium 8.7 8.5 - 10.1 mg/dL    Bilirubin Total 0.6 0.2 - 1.3 mg/dL    Albumin 3.9 3.4 - 5.0 g/dL    Protein Total 7.9 6.8 - 8.8 g/dL    Alkaline Phosphatase 102 40 - 150 U/L    ALT 84 (H) 0 - 50 U/L    AST 33 0 - 45 U/L   Hemoglobin A1c   Result Value Ref Range    Hemoglobin A1C 5.5 4.3 - 6.0 %       If you have any questions or concerns, please call the clinic at the number listed above.       Sincerely,        RUBI PARK NP, APRN CNP / jg

## 2018-02-19 NOTE — PATIENT INSTRUCTIONS
Go to the  dietitian for a diet to prevent gallbladder attacks and for weight reduction     Avoid fatty foods. !!!!  This what stimulates the gallbladder to kick in.     Exercise exercise exercise  DAILY    Just start walking !!!!     Set realistic  Goals     Get your colonoscopy done - referral was given

## 2018-02-19 NOTE — NURSING NOTE
"Chief Complaint   Patient presents with     Hospital F/U       Initial /78 (BP Location: Right arm, Patient Position: Chair, Cuff Size: Adult Large)  Pulse 84  Temp 97.9  F (36.6  C) (Tympanic)  Wt 233 lb (105.7 kg)  BMI 40.95 kg/m2 Estimated body mass index is 40.95 kg/(m^2) as calculated from the following:    Height as of 9/22/17: 5' 3.25\" (1.607 m).    Weight as of this encounter: 233 lb (105.7 kg).  Medication Reconciliation: complete     Jackelyn Shine CMA (AAMA)      "

## 2018-02-19 NOTE — PROGRESS NOTES
SUBJECTIVE:   Natalie Holt is a 62 year old female who presents to clinic today for the following health issues:    *Would like to get a week of thyroid medication, she [place] cabin and will not be going back up for another week yet.    ED/UC Followup:    Facility:  [facility]  Date of visit: 02/15/18  Reason for visit: Abdominal pain  Current Status: Is doing much better, is avoiding the causative foods.          Is here for follow up from [facility]  After having  RUQ pain - did have ultrasound that was positive with  Common bile duct enlargement . Has had RQU pain after eating rich foods.but this time the attack lasted for  2 hours.     Problem list and histories reviewed & adjusted, as indicated.  Additional history: as documented    Patient Active Problem List   Diagnosis     Post-menopausal bleeding     Abnormal Pap smear     CARDIOVASCULAR SCREENING; LDL GOAL LESS THAN 160     24 hour contact handout given     Other symptoms involving nervous and musculoskeletal systems(901.99)     Tubular adenoma of colon     Hypothyroidism due to acquired atrophy of thyroid     Elevated glucose     Vitamin D deficiency     Advanced directives, counseling/discussion     History of colonic polyps     Morbid obesity due to excess calories (H)     Past Surgical History:   Procedure Laterality Date     C ANESTH,ANKLE REPLACEMENT      Left ankle, 3 pin inserted.     C ANESTH,ELBOW AREA SURGERY      Nasim inserted into her left elbow.     C FACIAL REPLACEMENT      Repair to the right side of her temple.     C SOLID INSERT,FOAM,HOOKON HARDWARE      Removal of 3 pins from her Left ankle       Social History   Substance Use Topics     Smoking status: Never Smoker     Smokeless tobacco: Never Used      Comment: Social smoker on rare occsions.     Alcohol use Yes      Comment: Occasional beer per week.     Family History   Problem Relation Age of Onset     Gynecology Daughter      Cyst on her ovary          Current Outpatient Prescriptions   Medication Sig Dispense Refill     levothyroxine (SYNTHROID/LEVOTHROID) 25 MCG tablet Take 1 tablet (25 mcg) by mouth daily 90 tablet 2     Allergies   Allergen Reactions     Nkda [No Known Drug Allergies]      BP Readings from Last 3 Encounters:   02/19/18 134/78   11/22/17 (!) 150/95   09/22/17 110/68    Wt Readings from Last 3 Encounters:   02/19/18 233 lb (105.7 kg)   09/22/17 234 lb 4 oz (106.3 kg)   04/25/17 221 lb 6.4 oz (100.4 kg)                    Reviewed and updated as needed this visit by clinical staff       Reviewed and updated as needed this visit by Provider         ROS:  CONSTITUTIONAL: NEGATIVE for fever, chills, POSITIVE change in weight, has been gaining weight   ENT/MOUTH: NEGATIVE for ear, mouth and throat problems  RESP: NEGATIVE for significant cough or SOB  CV: NEGATIVE for chest pain, palpitations or peripheral edema  GI: POSITIVE for abdominal pain RUQ on 2/15/18.   Was seen in the ER in [place] .   + cholecystolithiasis, common bile duct was slightly enlarged. Does have multiple intraluminal calculi.   Has had multiple attacks but this past one was the worst.  Diet is mostly foods that are fatty .  Pizza, wings, burgers, ...       OBJECTIVE:     /78 (BP Location: Right arm, Patient Position: Chair, Cuff Size: Adult Large)  Pulse 84  Temp 97.9  F (36.6  C) (Tympanic)  Wt 233 lb (105.7 kg)  BMI 40.95 kg/m2  Body mass index is 40.95 kg/(m^2).   GENERAL: healthy, alert and no distress  RESP: lungs clear to auscultation - no rales, rhonchi or wheezes  CV: regular rate and rhythm, normal S1 S2, no S3 or S4, no murmur, click or rub, no peripheral edema and peripheral pulses strong  ABDOMEN: soft, nontender, without hepatosplenomegaly or masses, no organomegaly or masses, bowel sounds normal and     Diagnostic Test Results:  none     ASSESSMENT/PLAN:     ASSESSMENT/PLAN:      ICD-10-CM    1. Calculus of gallbladder without cholecystitis without  "obstruction K80.20 NUTRITION REFERRAL   2. Morbid obesity due to excess calories (H) E66.01 NUTRITION REFERRAL   3. Hypothyroidism due to acquired atrophy of thyroid E03.4 levothyroxine (SYNTHROID/LEVOTHROID) 25 MCG tablet   4. History of colonic polyps Z86.010 GASTROENTEROLOGY ADULT REF PROCEDURE ONLY [facility] [phone]   5. Special screening for malignant neoplasms, colon Z12.11 GASTROENTEROLOGY ADULT REF PROCEDURE ONLY [facility] [phone]   6. Elevated glucose R73.09 Comprehensive metabolic panel     Hemoglobin A1c   7. Elevated liver function tests R79.89 Comprehensive metabolic panel       Patient Instructions   Go to the  dietitian for a diet to prevent gallbladder attacks and for weight reduction     Avoid fatty foods. !!!!  This what stimulates the gallbladder to kick in.     Exercise exercise exercise  DAILY    Just start walking !!!!     Set realistic  Goals     Get your colonoscopy done - referral was given                  BMI:   Estimated body mass index is 40.95 kg/(m^2) as calculated from the following:    Height as of 9/22/17: 5' 3.25\" (1.607 m).    Weight as of this encounter: 233 lb (105.7 kg).   Weight management plan: Discussed healthy diet and exercise guidelines and patient will follow up in 6 months in clinic to re-evaluate.      CONSULTATION/REFERRAL to Dietitian   Work on weight loss  Regular exercise  See Patient Instructions    RUBI PARK NP, APRN [facility]    "

## 2018-02-19 NOTE — MR AVS SNAPSHOT
After Visit Summary   2/19/2018    Natalie Holt    MRN: [id number]           Patient Information     Date Of Birth          1955        Visit Information        Provider Department      2/19/2018 1:40 PM Winnie Roldan APRN [facility]        Today's Diagnoses     Calculus of gallbladder without cholecystitis without obstruction    -  1    Morbid obesity due to excess calories (H)        Hypothyroidism due to acquired atrophy of thyroid        History of colonic polyps        Special screening for malignant neoplasms, colon          Care Instructions    Go to the  dietitian for a diet to prevent gallbladder attacks and for weight reduction     Avoid fatty foods. !!!!  This what stimulates the gallbladder to kick in.     Exercise exercise exercise  DAILY    Just start walking !!!!     Set realistic  Goals               Follow-ups after your visit        Additional Services     GASTROENTEROLOGY ADULT REF PROCEDURE ONLY [facility] [phone]       Last Lab Result: Creatinine (mg/dL)       Date                     Value                 06/28/2017               0.58             ----------  Body mass index is 40.95 kg/(m^2).     Needed:  No  Language:  English    Patient will be contacted to schedule procedure.     Please be aware that coverage of these services is subject to the terms and limitations of your health insurance plan.  Call member services at your health plan with any benefit or coverage questions.  Any procedures must be performed at a [facility] facility OR coordinated by your clinic's referral office.    Please bring the following with you to your appointment:    (1) Any X-Rays, CTs or MRIs which have been performed.  Contact the facility where they were done to arrange for  prior to your scheduled appointment.    (2) List of current medications   (3) This referral request   (4) Any documents/labs given to you for this referral    "         NUTRITION REFERRAL       Your provider has referred you to: [facility]: [facility] [phone]   [email or web address]  [facility]: [facility] [phone]   [email or web address]/  [facility]: [facility] [place] [phone]   [email or web address]/  [facility]: [facility] [phone]   [email or web address]/    Please be aware that coverage of these services is subject to the terms and limitations of your health insurance plan.  Call member services at your health plan with any benefit or coverage questions.      Please bring the following with you to your appointment:    (1) This referral request  (2) Any documents given to you regarding this referral  (3) Any specific questions you have about diet and/or food choices                  Who to contact     Normal or non-critical lab and imaging results will be communicated to you by Fitzealhart, letter or phone within 4 business days after the clinic has received the results. If you do not hear from us within 7 days, please contact the clinic through Fitzealhart or phone. If you have a critical or abnormal lab result, we will notify you by phone as soon as possible.  Submit refill requests through Trendyol or call your pharmacy and they will forward the refill request to us. Please allow 3 business days for your refill to be completed.          If you need to speak with a  for additional information , please call: [phone]           Additional Information About Your Visit        Trendyol Information     Trendyol lets you send messages to your doctor, view your test results, renew your prescriptions, schedule appointments and more. To sign up, go to [email or web address] . Click on \"Log in\" on the left side of the screen, which will take you to the Welcome page. Then click on \"Sign up Now\" on " the right side of the page.     You will be asked to enter the access code listed below, as well as some personal information. Please follow the directions to create your username and password.     Your access code is: [email or web address]  Expires: 2018  2:17 PM     Your access code will  in 90 days. If you need help or a new code, please call your [facility] clinic or [phone].        Care EveryWhere ID     This is your Care EveryWhere ID. This could be used by other organizations to access your [facility] medical records  [id number]        Your Vitals Were     Pulse Temperature BMI (Body Mass Index)             84 97.9  F (36.6  C) (Tympanic) 40.95 kg/m2          Blood Pressure from Last 3 Encounters:   18 134/78   17 (!) 150/95   17 110/68    Weight from Last 3 Encounters:   18 233 lb (105.7 kg)   17 234 lb 4 oz (106.3 kg)   17 221 lb 6.4 oz (100.4 kg)              We Performed the Following     GASTROENTEROLOGY ADULT REF PROCEDURE ONLY [facility] [phone]     NUTRITION REFERRAL          Where to get your medicines      These medications were sent to [facility] [address]  [address] [facility] [address]     Phone:  [phone]     levothyroxine 25 MCG tablet          Primary Care Provider Office Phone # Fax #    Winnie Roldan, APRN [facility] [phone] [address] [facility] [facility] [address]        Equal Access to Services     [facility] Center[facility]IB[facility]LL : Hadii aad lalita hadasho Soomaali, waaxda luqadaha, qaybta kaalmada paulino, michael gallego. So [facility] [phone].    ATENCIÓN: Si habla español, tiene a hanna disposición servicios gratuitos de asistencia lingüística. Llame al [phone].    We comply with applicable federal civil rights laws and [place] laws. We do not discriminate on the basis of race, color, national origin, age, disability, sex, sexual orientation, or  gender identity.            Thank you!     Thank you for choosing [facility]  for your care. Our goal is always to provide you with excellent care. Hearing back from our patients is one way we can continue to improve our services. Please take a few minutes to complete the written survey that you may receive in the mail after your visit with us. Thank you!             Your Updated Medication List - Protect others around you: Learn how to safely use, store and throw away your medicines at www.disposemymeds.org.          This list is accurate as of 2/19/18  2:17 PM.  Always use your most recent med list.                   Brand Name Dispense Instructions for use Diagnosis    levothyroxine 25 MCG tablet    SYNTHROID/LEVOTHROID    90 tablet    Take 1 tablet (25 mcg) by mouth daily    Hypothyroidism due to acquired atrophy of thyroid

## 2018-02-22 ENCOUNTER — TELEPHONE (OUTPATIENT)
Dept: FAMILY MEDICINE | Facility: CLINIC | Age: 63
End: 2018-02-22

## 2018-02-22 NOTE — TELEPHONE ENCOUNTER
Nutrition Education Scheduling Outreach #1:    Call to patient to schedule. Patient declined to schedule.        Joseph Jernigan  [place]  Diabetes and Nutrition Scheduling

## 2018-03-29 ENCOUNTER — TELEPHONE (OUTPATIENT)
Dept: FAMILY MEDICINE | Facility: CLINIC | Age: 63
End: 2018-03-29

## 2018-03-29 NOTE — TELEPHONE ENCOUNTER
Pt believes she has a yeast infection and looking to get treated over the phone, please call to triage.     Ashley Yadav, [place]

## 2018-03-29 NOTE — TELEPHONE ENCOUNTER
Urinary incontinence--poss UTI     Spoke with patient  She is not sure what going on she is not able to control her bladder having incontinence,has trip planned with daughters not sure if she can go  Denies burning, fever or ordor to urine  Has a little back pain   Advised appt  Made for tomorrow AM  ANAIS Sharpe  Clinic  RN/Andrew Silva

## 2018-03-30 ENCOUNTER — OFFICE VISIT (OUTPATIENT)
Dept: FAMILY MEDICINE | Facility: CLINIC | Age: 63
End: 2018-03-30
Payer: COMMERCIAL

## 2018-03-30 VITALS
BODY MASS INDEX: 39.85 KG/M2 | WEIGHT: 233.4 LBS | SYSTOLIC BLOOD PRESSURE: 132 MMHG | DIASTOLIC BLOOD PRESSURE: 80 MMHG | RESPIRATION RATE: 16 BRPM | HEIGHT: 64 IN | HEART RATE: 76 BPM | TEMPERATURE: 97.4 F

## 2018-03-30 DIAGNOSIS — R30.0 DYSURIA: Primary | ICD-10-CM

## 2018-03-30 DIAGNOSIS — N39.490 OVERFLOW INCONTINENCE: ICD-10-CM

## 2018-03-30 DIAGNOSIS — N30.00 ACUTE CYSTITIS WITHOUT HEMATURIA: ICD-10-CM

## 2018-03-30 LAB
ALBUMIN UR-MCNC: NEGATIVE MG/DL
APPEARANCE UR: CLEAR
BACTERIA #/AREA URNS HPF: ABNORMAL /HPF
BILIRUB UR QL STRIP: NEGATIVE
COLOR UR AUTO: YELLOW
GLUCOSE UR STRIP-MCNC: NEGATIVE MG/DL
HGB UR QL STRIP: NEGATIVE
KETONES UR STRIP-MCNC: NEGATIVE MG/DL
LEUKOCYTE ESTERASE UR QL STRIP: ABNORMAL
NITRATE UR QL: NEGATIVE
NON-SQ EPI CELLS #/AREA URNS LPF: ABNORMAL /LPF
PH UR STRIP: 6 PH (ref 5–7)
RBC #/AREA URNS AUTO: ABNORMAL /HPF
SOURCE: ABNORMAL
SP GR UR STRIP: 1.02 (ref 1–1.03)
SPECIMEN SOURCE: NORMAL
UROBILINOGEN UR STRIP-ACNC: 0.2 EU/DL (ref 0.2–1)
WBC #/AREA URNS AUTO: ABNORMAL /HPF
WET PREP SPEC: NORMAL

## 2018-03-30 PROCEDURE — 99213 OFFICE O/P EST LOW 20 MIN: CPT | Performed by: NURSE PRACTITIONER

## 2018-03-30 PROCEDURE — 87210 SMEAR WET MOUNT SALINE/INK: CPT | Performed by: NURSE PRACTITIONER

## 2018-03-30 PROCEDURE — 81001 URINALYSIS AUTO W/SCOPE: CPT | Performed by: NURSE PRACTITIONER

## 2018-03-30 RX ORDER — SULFAMETHOXAZOLE/TRIMETHOPRIM 800-160 MG
1 TABLET ORAL 2 TIMES DAILY
Qty: 10 TABLET | Refills: 0 | Status: SHIPPED | OUTPATIENT
Start: 2018-03-30 | End: 2018-04-04

## 2018-03-30 ASSESSMENT — ENCOUNTER SYMPTOMS
WHEEZING: 0
CHILLS: 0
EYE ITCHING: 0
FEVER: 0
SINUS PRESSURE: 0
COUGH: 0
DIAPHORESIS: 0
EYE DISCHARGE: 0
SORE THROAT: 0
RHINORRHEA: 0
FREQUENCY: 0
DIZZINESS: 0
FLANK PAIN: 1
VOMITING: 0
DYSURIA: 0
PALPITATIONS: 0
NAUSEA: 0
DIARRHEA: 0
SHORTNESS OF BREATH: 0
FATIGUE: 0
CONSTIPATION: 0
LIGHT-HEADEDNESS: 0
HEADACHES: 0

## 2018-03-30 ASSESSMENT — PAIN SCALES - GENERAL: PAINLEVEL: NO PAIN (0)

## 2018-03-30 NOTE — MR AVS SNAPSHOT
After Visit Summary   3/30/2018    Natalie Holt    MRN: [id number]           Patient Information     Date Of Birth          1955        Visit Information        Provider Department      3/30/2018 8:00 AM Ashwini Pollard APRN [facility]        Today's Diagnoses     Dysuria    -  1    Acute cystitis without hematuria        Stress incontinence of urine           Follow-ups after your visit        Additional Services     [facility] PT, HAND, AND CHIROPRACTIC REFERRAL       **This order will print in the [facility] Scheduling Office**    Physical Therapy, Hand Therapy and Chiropractic Care are available through:    *[facility]  *[facility]  *[facility]    Call one number to schedule at any of the above locations: [phone].    Your provider has referred you to: Physical Therapy at [facility] or [facility]    Indication/Reason for Referral: Women's Health (Please Complete Special Programs SmartList)  Onset of Illness:   Therapy Orders: Evaluate and Treat  Special Programs: None and Women's Health: Pelvic Floor Weakness: Incontinence:  and  Per PHYSICAL THERAPY  Discretion   Special Request: None    Farheen Zhong      Additional Comments for the Therapist or Chiropractor:     Please be aware that coverage of these services is subject to the terms and limitations of your health insurance plan.  Call member services at your health plan with any benefit or coverage questions.      Please bring the following to your appointment:    *Your personal calendar for scheduling future appointments  *Comfortable clothing                  Who to contact     Normal or non-critical lab and imaging results will be communicated to you by MyChart, letter or phone within 4 business days after the clinic has received the results. If you do not hear from us within 7 days, please contact the clinic through MyChart or phone. If you have a critical or abnormal  "lab result, we will notify you by phone as soon as possible.  Submit refill requests through Q-Layer or call your pharmacy and they will forward the refill request to us. Please allow 3 business days for your refill to be completed.          If you need to speak with a  for additional information , please call: [phone]           Additional Information About Your Visit        Q-Layer Information     Q-Layer lets you send messages to your doctor, view your test results, renew your prescriptions, schedule appointments and more. To sign up, go to [email or web address] . Click on \"Log in\" on the left side of the screen, which will take you to the Welcome page. Then click on \"Sign up Now\" on the right side of the page.     You will be asked to enter the access code listed below, as well as some personal information. Please follow the directions to create your username and password.     Your access code is: [email or web address]  Expires: 2018  3:17 PM     Your access code will  in 90 days. If you need help or a new code, please call your [place] clinic or [phone].        Care EveryWhere ID     This is your Care EveryWhere ID. This could be used by other organizations to access your [place] medical records  [id number]        Your Vitals Were     Pulse Temperature Respirations Height BMI (Body Mass Index)       76 97.4  F (36.3  C) (Tympanic) 16 5' 3.5\" (1.613 m) 40.7 kg/m2        Blood Pressure from Last 3 Encounters:   18 132/80   18 134/78   17 (!) 150/95    Weight from Last 3 Encounters:   18 233 lb 6.4 oz (105.9 kg)   18 233 lb (105.7 kg)   17 234 lb 4 oz (106.3 kg)              We Performed the Following     [facility] PT, HAND, AND CHIROPRACTIC REFERRAL     UA reflex to Microscopic and Culture     Urine Microscopic     Wet prep          Today's Medication Changes          These changes are accurate as of 3/30/18  8:44 AM.  If you have any questions, ask " your nurse or doctor.               Start taking these medicines.        Dose/Directions    sulfamethoxazole-trimethoprim 800-160 MG per tablet   Commonly known as:  BACTRIM DS/SEPTRA DS   Used for:  Acute cystitis without hematuria   Started by:  Ashwini Pollard APRN CNP        Dose:  1 tablet   Take 1 tablet by mouth 2 times daily for 5 days   Quantity:  10 tablet   Refills:  0            Where to get your medicines      These medications were sent to [facility] [address]  [address], [facility] [address]     Phone:  [phone]     sulfamethoxazole-trimethoprim 800-160 MG per tablet                Primary Care Provider Office Phone # Fax #    LEXX Bridges [phone] [address] [facility]   [facility] [address]        Equal Access to Services     KATLYN MORAES : Hadii aad ku hadasho Soraul, waaxda luqadaha, qaybta kaalmada adeegyada, michael soni . So [facility] [phone].    ATENCIÓN: Si habla español, tiene a hanna disposición servicios gratuitos de asistencia lingüística. [facility] [phone].    We comply with applicable federal civil rights laws and [place] laws. We do not discriminate on the basis of race, color, national origin, age, disability, sex, sexual orientation, or gender identity.            Thank you!     Thank you for choosing [facility]  for your care. Our goal is always to provide you with excellent care. Hearing back from our patients is one way we can continue to improve our services. Please take a few minutes to complete the written survey that you may receive in the mail after your visit with us. Thank you!             Your Updated Medication List - Protect others around you: Learn how to safely use, store and throw away your medicines at www.disposemymeds.org.          This list is accurate as of 3/30/18  8:44 AM.  Always use your most recent med list.                    Brand Name Dispense Instructions for use Diagnosis    levothyroxine 25 MCG tablet    SYNTHROID/LEVOTHROID    90 tablet    Take 1 tablet (25 mcg) by mouth daily    Hypothyroidism due to acquired atrophy of thyroid       sulfamethoxazole-trimethoprim 800-160 MG per tablet    BACTRIM DS/SEPTRA DS    10 tablet    Take 1 tablet by mouth 2 times daily for 5 days    Acute cystitis without hematuria

## 2018-03-30 NOTE — NURSING NOTE
"Chief Complaint   Patient presents with     UTI       Initial /80 (BP Location: Left arm, Patient Position: Sitting, Cuff Size: Adult Large)  Pulse 76  Temp 97.4  F (36.3  C) (Tympanic)  Resp 16  Ht 5' 3.5\" (1.613 m)  Wt 233 lb 6.4 oz (105.9 kg)  BMI 40.7 kg/m2 Estimated body mass index is 40.7 kg/(m^2) as calculated from the following:    Height as of this encounter: 5' 3.5\" (1.613 m).    Weight as of this encounter: 233 lb 6.4 oz (105.9 kg).  Medication Reconciliation: complete     April ALEJANDRO Rose      "

## 2018-05-15 DIAGNOSIS — N30.00 ACUTE CYSTITIS WITHOUT HEMATURIA: ICD-10-CM

## 2018-05-15 RX ORDER — SULFAMETHOXAZOLE/TRIMETHOPRIM 800-160 MG
TABLET ORAL
Qty: 10 TABLET | Refills: 0 | OUTPATIENT
Start: 2018-05-15

## 2018-05-15 NOTE — TELEPHONE ENCOUNTER
"Requested Prescriptions   Pending Prescriptions Disp Refills     sulfamethoxazole-trimethoprim (BACTRIM DS/SEPTRA DS) 800-160 MG per tablet [Pharmacy Med Name: SMZ/TMP -160MG TAB] 10 tablet 0    Last Written Prescription Date:  03/30/2018  #10 x 0  Last filled 03/30/2018  Last office visit: 3/30/2018 DEXTER Pollard  Future Office Visit:  None    Note: Medication is not on the current med list.   Sig: TAKE ONE TABLET BY MOUTH TWICE A DAY FOR 5 DAYS    Oral Acne/Rosacea Medications Protocol Failed    5/15/2018 10:06 AM       Failed - Confirmation of diagnosis is required    Please confirm diagnosis is acne or rosacea.     If NOT acne or rosacea; refer request to provider for further evaluation.    If diagnosis IS acne or rosacea, OK to refill BASED ON PREVIOUS REFILL CLINICAL NOTE RECOMMENDATION.         Passed - Patient is 12 years of age or older       Passed - Recent (12 mo) or future (30 days) visit within the authorizing provider's specialty    Patient had office visit in the last 12 months or has a visit in the next 30 days with authorizing provider or within the authorizing provider's specialty.  See \"Patient Info\" tab in inbasket, or \"Choose Columns\" in Meds & Orders section of the refill encounter.           Passed - No active pregnancy on record       Passed - No positive prenancy test is past 12 months          "

## 2018-06-18 ENCOUNTER — TELEPHONE (OUTPATIENT)
Dept: FAMILY MEDICINE | Facility: CLINIC | Age: 63
End: 2018-06-18

## 2018-06-18 DIAGNOSIS — R10.11 RUQ ABDOMINAL PAIN: Primary | ICD-10-CM

## 2018-06-18 NOTE — TELEPHONE ENCOUNTER
patient would like to talk to a nurse about gallbladder issues.    Andrew Novak [facility]

## 2018-06-18 NOTE — TELEPHONE ENCOUNTER
"S-(situation): Pt calling with ongoing gallbladder attacks.    B-(background): 2/15/18 seen in ED, Office Visit f/u 18 for gallstones.    A-(assessment):   Been having gallbladder attacks, \"Getting worse and worse and I just want to get them out. \"  \"Can I get help, to get these stones out.\"  Says she is staying away from \"grease.\"  Pt's dad just  and  was 6/15/18; pt went out to eat and had hamburger and french fries and had an attack.  Has not met with nutritionist or set up colonoscopy as directed at OV 18.  Med review done.    Asked pt what she does to alleviate an attack:  \"Put my finger down my throat and throw everything up I just ate.\"  \"Rock really hard in a rocking chair.\"  \"Put essential oil in my mouth and that helped the pain.\"    R-(recommendations): Routed to PCP to advise on recommendations for this pt.     Phyllis PRUITT RN        "

## 2018-06-18 NOTE — TELEPHONE ENCOUNTER
She can call for  Abdominal U/S to r/o gallbladder - she will need to call [facility] for appointment    [facility]  [address] [place] [address] [phone]      And I will also have referral to surgeon     Continue to avoid greasy/fatty foods

## 2018-06-27 NOTE — TELEPHONE ENCOUNTER
6/27/2018  Called patient she has been at her cabin, still having problem eating   Advised need for US given number to call to schedule  She is coming home tomorrow will try and get scheduled  To f/u with Jamar as need  ANAIS Sharpe  Clinic  RN/Andrew Silva

## 2018-06-28 ENCOUNTER — RADIANT APPOINTMENT (OUTPATIENT)
Dept: ULTRASOUND IMAGING | Facility: CLINIC | Age: 63
End: 2018-06-28
Attending: NURSE PRACTITIONER
Payer: COMMERCIAL

## 2018-06-28 DIAGNOSIS — R10.11 RUQ ABDOMINAL PAIN: ICD-10-CM

## 2018-06-28 PROCEDURE — 76700 US EXAM ABDOM COMPLETE: CPT

## 2018-07-02 DIAGNOSIS — K80.80 BILIARY CALCULUS OF OTHER SITE WITHOUT OBSTRUCTION: Primary | ICD-10-CM

## 2018-07-17 NOTE — PROGRESS NOTES
SUBJECTIVE:   Natalie Holt is a 62 year old female who presents to clinic today for the following health issues:    Reminded to schedule colonoscopy that was ordered 2/19/18.    URINARY TRACT SYMPTOMS      Duration: 2 weeks    Description  Occasional Incontinence     Intensity:  moderate    Accompanying signs and symptoms:  Fever/chills: no   Flank pain YES- right  Nausea and vomiting: no   Vaginal symptoms: none  Abdominal/Pelvic Pain: no     History  History of frequent UTI's: no   History of kidney stones: no   Sexually Active: YES  Possibility of pregnancy: No    Precipitating or alleviating factors: None    Therapies tried and outcome: none     Two weeks of occasional incontinence, after she has held her urine in for long periods of time maybe 1x every 2-3 days. Pt denies bladder leakage today. Associated with mild flank pain.   No pain, frequency, urgency with urination, urine looks concentrated, no odor  Pt does not drink a lot of water. Denies fever/chills, cough, wheezing, SOB, chest pain/palpitations.     Pt is sexually active no new partner. Pt has increase of vaginal discharge- mucous in consistently, no odor.     Problem list and histories reviewed & adjusted, as indicated.  Additional history: as documented    Patient Active Problem List   Diagnosis     Post-menopausal bleeding     Abnormal Pap smear     CARDIOVASCULAR SCREENING; LDL GOAL LESS THAN 160     24 hour contact handout given     Other symptoms involving nervous and musculoskeletal systems(321.99)     Tubular adenoma of colon     Hypothyroidism due to acquired atrophy of thyroid     Elevated glucose     Vitamin D deficiency     Advanced directives, counseling/discussion     History of colonic polyps     Morbid obesity due to excess calories (H)     Elevated liver function tests     Overflow incontinence     Past Surgical History:   Procedure Laterality Date     C ANESTH,ANKLE REPLACEMENT      Left ankle, 3 pin inserted.     C  Problem list     Subjective   Karlo Gramajo ANESTH,ELBOW AREA SURGERY      Nasim inserted into her left elbow.     C FACIAL REPLACEMENT      Repair to the right side of her temple.     C SOLID INSERT,FOAM,HOOKON HARDWARE      Removal of 3 pins from her Left ankle       Social History   Substance Use Topics     Smoking status: Never Smoker     Smokeless tobacco: Never Used      Comment: Social smoker on rare occsions.     Alcohol use Yes      Comment: Occasional beer per week.     Family History   Problem Relation Age of Onset     Gynecology Daughter      Cyst on her ovary         Current Outpatient Prescriptions   Medication Sig Dispense Refill     sulfamethoxazole-trimethoprim (BACTRIM DS/SEPTRA DS) 800-160 MG per tablet Take 1 tablet by mouth 2 times daily for 5 days 10 tablet 0     levothyroxine (SYNTHROID/LEVOTHROID) 25 MCG tablet Take 1 tablet (25 mcg) by mouth daily 90 tablet 2     Allergies   Allergen Reactions     Nkda [No Known Drug Allergies]        Reviewed and updated as needed this visit by clinical staff  Tobacco  Allergies  Meds  Med Hx  Surg Hx  Fam Hx  Soc Hx      Reviewed and updated as needed this visit by Provider         ROS:  Review of Systems   Constitutional: Negative for chills, diaphoresis, fatigue and fever.   HENT: Negative for ear discharge, ear pain, hearing loss, rhinorrhea, sinus pressure and sore throat.    Eyes: Negative for discharge and itching.   Respiratory: Negative for cough, shortness of breath and wheezing.    Cardiovascular: Negative for chest pain and palpitations.   Gastrointestinal: Negative for constipation, diarrhea, nausea and vomiting.   Genitourinary: Positive for flank pain and vaginal discharge. Negative for dysuria, frequency, urgency, vaginal bleeding and vaginal pain.        Urinary incontinence     Skin: Negative for rash.   Neurological: Negative for dizziness, light-headedness and headaches.         OBJECTIVE:     /80 (BP Location: Left arm, Patient Position: Sitting, Cuff Size: Adult Large)  " Pulse 76  Temp 97.4  F (36.3  C) (Tympanic)  Resp 16  Ht 5' 3.5\" (1.613 m)  Wt 233 lb 6.4 oz (105.9 kg)  BMI 40.7 kg/m2  Body mass index is 40.7 kg/(m^2).  Physical Exam   Constitutional: She is oriented to person, place, and time. She appears well-developed and well-nourished. No distress.   HENT:   Right Ear: Tympanic membrane and external ear normal.   Left Ear: Tympanic membrane and external ear normal.   Nose: No mucosal edema or rhinorrhea.   Cardiovascular: Normal rate, regular rhythm, S1 normal, S2 normal and normal heart sounds.  Exam reveals no gallop and no friction rub.    No murmur heard.  Pulmonary/Chest: Effort normal and breath sounds normal. She has no wheezes. She has no rales. She exhibits no tenderness.   Abdominal: Soft. Bowel sounds are normal. There is no tenderness. There is no CVA tenderness.   Neurological: She is alert and oriented to person, place, and time.   Skin: Skin is warm and dry. She is not diaphoretic.   Psychiatric: She has a normal mood and affect. Her behavior is normal. Thought content normal.       Diagnostic Test Results:  Results for orders placed or performed in visit on 03/30/18 (from the past 24 hour(s))   Wet prep   Result Value Ref Range    Specimen Description Vagina     Wet Prep No clue cells seen     Wet Prep No yeast seen     Wet Prep No Trichomonas seen    UA reflex to Microscopic and Culture   Result Value Ref Range    Color Urine Yellow     Appearance Urine Clear     Glucose Urine Negative NEG^Negative mg/dL    Bilirubin Urine Negative NEG^Negative    Ketones Urine Negative NEG^Negative mg/dL    Specific Gravity Urine 1.020 1.003 - 1.035    Blood Urine Negative NEG^Negative    pH Urine 6.0 5.0 - 7.0 pH    Protein Albumin Urine Negative NEG^Negative mg/dL    Urobilinogen Urine 0.2 0.2 - 1.0 EU/dL    Nitrite Urine Negative NEG^Negative    Leukocyte Esterase Urine Small (A) NEG^Negative    Source Midstream Urine    Urine Microscopic   Result Value Ref Range " well-nourished. No distress.   HENT:   Head: Normocephalic and atraumatic.   Eyes: Conjunctivae and EOM are normal. Pupils are equal, round, and reactive to light.   Neck: Normal range of motion. Neck supple. No JVD present. No tracheal deviation present.   Cardiovascular: Normal rate, regular rhythm, normal heart sounds and intact distal pulses.    Pulmonary/Chest: Effort normal and breath sounds normal.   Abdominal: Soft. Bowel sounds are normal. He exhibits no distension and no mass. There is no tenderness. There is no rebound and no guarding.   Musculoskeletal: Normal range of motion. He exhibits no edema, tenderness or deformity.   Neurological: He is alert and oriented to person, place, and time.   Skin: Skin WBC Urine 0 - 5 OTO5^0 - 5 /HPF    RBC Urine O - 2 OTO2^O - 2 /HPF    Squamous Epithelial /LPF Urine Few FEW^Few /LPF    Bacteria Urine Few (A) NEG^Negative /HPF       ASSESSMENT/PLAN:     1. Dysuria  - Wet prep  - UA reflex to Microscopic and Culture  - Urine Microscopic    2. Acute cystitis without hematuria  Patient counseled regarding prevention of UTI's  Patient instructed to return for fever, vomiting, rash, flank/back pain or if symptoms not resolved in 2 days  Start Bactrim today.  Increase water intake.  Try scheduling voiding every 4 hours.   If fever develops over 101 or back pain return to clinic.   - sulfamethoxazole-trimethoprim (BACTRIM DS/SEPTRA DS) 800-160 MG per tablet; Take 1 tablet by mouth 2 times daily for 5 days  Dispense: 10 tablet; Refill: 0    3. Overflow incontinence  Referral placed for physical therapy for pelvic floor muscle strengthening.   - [facility] PT, HAND, AND CHIROPRACTIC REFERRAL      Guadalupe Crawford RN [employer] Student Nurse Practitioner     I agree with the PFSH and ROS as completed by the NP student. The remainder of the encounter was performed by me and scribed the NP student. The scribed note accurately reflects my personal services and the decisions made by me. LEXX Amaya, NP-C    LEXX Worley [facility]

## 2018-08-29 ENCOUNTER — OFFICE VISIT (OUTPATIENT)
Dept: FAMILY MEDICINE | Facility: CLINIC | Age: 63
End: 2018-08-29
Payer: COMMERCIAL

## 2018-08-29 VITALS
DIASTOLIC BLOOD PRESSURE: 80 MMHG | HEART RATE: 68 BPM | HEIGHT: 64 IN | SYSTOLIC BLOOD PRESSURE: 114 MMHG | BODY MASS INDEX: 38.29 KG/M2 | TEMPERATURE: 98.3 F | WEIGHT: 224.25 LBS

## 2018-08-29 DIAGNOSIS — R74.8 ELEVATED LIVER ENZYMES: ICD-10-CM

## 2018-08-29 DIAGNOSIS — Z01.818 PREOP GENERAL PHYSICAL EXAM: Primary | ICD-10-CM

## 2018-08-29 DIAGNOSIS — K80.20 GALLSTONES: ICD-10-CM

## 2018-08-29 DIAGNOSIS — E03.4 HYPOTHYROIDISM DUE TO ACQUIRED ATROPHY OF THYROID: ICD-10-CM

## 2018-08-29 LAB
ALBUMIN SERPL-MCNC: 3.9 G/DL (ref 3.4–5)
ALP SERPL-CCNC: 266 U/L (ref 40–150)
ALT SERPL W P-5'-P-CCNC: 920 U/L (ref 0–50)
ANION GAP SERPL CALCULATED.3IONS-SCNC: 8 MMOL/L (ref 3–14)
AST SERPL W P-5'-P-CCNC: 646 U/L (ref 0–45)
BILIRUB SERPL-MCNC: 1.4 MG/DL (ref 0.2–1.3)
BUN SERPL-MCNC: 14 MG/DL (ref 7–30)
CALCIUM SERPL-MCNC: 8.5 MG/DL (ref 8.5–10.1)
CHLORIDE SERPL-SCNC: 102 MMOL/L (ref 94–109)
CO2 SERPL-SCNC: 27 MMOL/L (ref 20–32)
CREAT SERPL-MCNC: 0.67 MG/DL (ref 0.52–1.04)
ERYTHROCYTE [DISTWIDTH] IN BLOOD BY AUTOMATED COUNT: 12.8 % (ref 10–15)
GFR SERPL CREATININE-BSD FRML MDRD: 89 ML/MIN/1.7M2
GLUCOSE SERPL-MCNC: 91 MG/DL (ref 70–99)
HCT VFR BLD AUTO: 43.1 % (ref 35–47)
HGB BLD-MCNC: 14.3 G/DL (ref 11.7–15.7)
MCH RBC QN AUTO: 29.9 PG (ref 26.5–33)
MCHC RBC AUTO-ENTMCNC: 33.2 G/DL (ref 31.5–36.5)
MCV RBC AUTO: 90 FL (ref 78–100)
PLATELET # BLD AUTO: 202 10E9/L (ref 150–450)
POTASSIUM SERPL-SCNC: 3.5 MMOL/L (ref 3.4–5.3)
PROT SERPL-MCNC: 8 G/DL (ref 6.8–8.8)
RBC # BLD AUTO: 4.79 10E12/L (ref 3.8–5.2)
SODIUM SERPL-SCNC: 137 MMOL/L (ref 133–144)
TSH SERPL DL<=0.005 MIU/L-ACNC: 1.61 MU/L (ref 0.4–4)
WBC # BLD AUTO: 7.1 10E9/L (ref 4–11)

## 2018-08-29 PROCEDURE — 80053 COMPREHEN METABOLIC PANEL: CPT | Performed by: FAMILY MEDICINE

## 2018-08-29 PROCEDURE — 93000 ELECTROCARDIOGRAM COMPLETE: CPT | Performed by: FAMILY MEDICINE

## 2018-08-29 PROCEDURE — 99214 OFFICE O/P EST MOD 30 MIN: CPT | Performed by: FAMILY MEDICINE

## 2018-08-29 PROCEDURE — 36415 COLL VENOUS BLD VENIPUNCTURE: CPT | Performed by: FAMILY MEDICINE

## 2018-08-29 PROCEDURE — 84443 ASSAY THYROID STIM HORMONE: CPT | Performed by: FAMILY MEDICINE

## 2018-08-29 PROCEDURE — 85027 COMPLETE CBC AUTOMATED: CPT | Performed by: FAMILY MEDICINE

## 2018-08-29 NOTE — MR AVS SNAPSHOT
After Visit Summary   8/29/2018    Natalie Holt    MRN: [id number]           Patient Information     Date Of Birth          1955        Visit Information        Provider Department      8/29/2018 2:20 PM Jenny Julian MD [facility]        Today's Diagnoses     Preop general physical exam    -  1    Gallstones        Hypothyroidism due to acquired atrophy of thyroid        Elevated liver enzymes          Care Instructions      Before Your Surgery      Call your surgeon if there is any change in your health. This includes signs of a cold or flu (such as a sore throat, runny nose, cough, rash or fever).    Do not smoke, drink alcohol or take over the counter medicine (unless your surgeon or primary care doctor tells you to) for the 24 hours before and after surgery.    If you take prescribed drugs: Follow your doctor s orders about which medicines to take and which to stop until after surgery.    Eating and drinking prior to surgery: follow the instructions from your surgeon    Take a shower or bath the night before surgery. Use the soap your surgeon gave you to gently clean your skin. If you do not have soap from your surgeon, use your regular soap. Do not shave or scrub the surgery site.  Wear clean pajamas and have clean sheets on your bed.           Follow-ups after your visit        Additional Services     GENERAL SURG ADULT REFERRAL       Your provider has referred you to: FM: [facility] [phone]   [email or web address] [place] [email or web address]\""/[facility]/    Please be aware that coverage of these services is subject to the terms and limitations of your health insurance plan.  Call member services at your health plan with any benefit or coverage questions.      Please bring the following with you to your appointment:    (1) Any X-Rays, CTs or MRIs which have been performed.  Contact the facility where they were done to arrange for  prior to your  "scheduled appointment.   (2) List of current medications   (3) This referral request   (4) Any documents/labs given to you for this referral                  Who to contact     Normal or non-critical lab and imaging results will be communicated to you by [employer], letter or phone within 4 business days after the clinic has received the results. If you do not hear from us within 7 days, please contact the clinic through [employer] or phone. If you have a critical or abnormal lab result, we will notify you by phone as soon as possible.  Submit refill requests through [employer] or call your pharmacy and they will forward the refill request to us. Please allow 3 business days for your refill to be completed.          If you need to speak with a  for additional information , please call: [phone]           Additional Information About Your Visit        [employer] Information     [employer] lets you send messages to your doctor, view your test results, renew your prescriptions, schedule appointments and more. To sign up, go to [email or web address] . Click on \"Log in\" on the left side of the screen, which will take you to the Welcome page. Then click on \"Sign up Now\" on the right side of the page.     You will be asked to enter the access code listed below, as well as some personal information. Please follow the directions to create your username and password.     Your access code is: [email or web address]  Expires: 2018 12:28 PM     Your access code will  in 90 days. If you need help or a new code, please call your [place] clinic or [phone].        Care EveryWhere ID     This is your Care EveryWhere ID. This could be used by other organizations to access your [place] medical records  [id number]        Your Vitals Were     Pulse Temperature Height BMI (Body Mass Index)          68 98.3  F (36.8  C) (Tympanic) 5' 3.5\" (1.613 m) 39.1 kg/m2         Blood Pressure from Last 3 Encounters:   18 " 114/80   03/30/18 132/80   02/19/18 134/78    Weight from Last 3 Encounters:   08/29/18 224 lb 4 oz (101.7 kg)   03/30/18 233 lb 6.4 oz (105.9 kg)   02/19/18 233 lb (105.7 kg)              We Performed the Following     CBC with platelets     Comprehensive metabolic panel (BMP + Alb, Alk Phos, ALT, AST, Total. Bili, TP)     EKG 12-lead complete w/read - Clinics     GENERAL SURG ADULT REFERRAL     TSH        Primary Care Provider Office Phone # Fax #    Winnie Roldan, APRN [phone] [address] [place] [address]        Equal Access to Services     KATLYN MORAES : Hadii lorena guzmano Elizabeth, waaxda luqadaha, qaybta kaalmada adeluciyada, michael gallego. So [facility] [phone].    ATENCIÓN: Si habla español, tiene a hanna disposición servicios gratuitos de asistencia lingüística. Ll[facility] [phone].    We comply with applicable federal civil rights laws and [place] laws. We do not discriminate on the basis of race, color, national origin, age, disability, sex, sexual orientation, or gender identity.            Thank you!     Thank you for choosing [facility] [place] [facility]  for your care. Our goal is always to provide you with excellent care. Hearing back from our patients is one way we can continue to improve our services. Please take a few minutes to complete the written survey that you may receive in the mail after your visit with us. Thank you!             Your Updated Medication List - Protect others around you: Learn how to safely use, store and throw away your medicines at www.disposemymeds.org.          This list is accurate as of 8/29/18 11:59 PM.  Always use your most recent med list.                   Brand Name Dispense Instructions for use Diagnosis    levothyroxine 25 MCG tablet    SYNTHROID/LEVOTHROID    90 tablet    Take 1 tablet (25 mcg) by mouth daily    Hypothyroidism due to acquired atrophy of thyroid

## 2018-08-29 NOTE — PROGRESS NOTES
[facility]  [address] [address]  [phone]  Dept: [phone]    PRE-OP EVALUATION:  Today's date: 2018    Natalie Holt (: 1955) presents for pre-operative evaluation assessment.  She requires evaluation and anesthesia risk assessment prior to undergoing surgery/procedure for treatment of gallstones .    Proposed Surgery/ Procedure: Gallstone removal  Date of Surgery/ Procedure: TBD  Time of Surgery/ Procedure: TBD  Hospital/Surgical Facility: [place]  Primary Physician: Winnie Roldan  Type of Anesthesia Anticipated: General    Patient has a Health Care Directive or Living Will:  NO    1. NO - Do you have a history of heart attack, stroke, stent, bypass or surgery on an artery in the head, neck, heart or legs?  2. NO - Do you ever have any pain or discomfort in your chest?  3. NO - Do you have a history of  Heart Failure?  4. NO - Are you troubled by shortness of breath when: walking on the level, up a slight hill or at night?  5. NO - Do you currently have a cold, bronchitis or other respiratory infection?  6. NO - Do you have a cough, shortness of breath or wheezing?  7. NO - Do you sometimes get pains in the calves of your legs when you walk?  8. NO - Do you or anyone in your family have previous history of blood clots?  9. NO - Do you or does anyone in your family have a serious bleeding problem such as prolonged bleeding following surgeries or cuts?  10. NO - Have you ever had problems with anemia or been told to take iron pills?  11. NO - Have you had any abnormal blood loss such as black, tarry or bloody stools, or abnormal vaginal bleeding?  12. YES - Have you ever had a blood transfusion? 1985 no sequelae.  13. NO - Have you or any of your relatives ever had problems with anesthesia?  14. NO - Do you have sleep apnea, excessive snoring or daytime drowsiness?  15. NO - Do you have any prosthetic heart valves?  16. NO - Do you have  prosthetic joints?  17. NO - Is there any chance that you may be pregnant?      HPI:     HPI related to upcoming procedure: Ongoing epigastric and right upper quadrant abdominal pain, typically postprandial and aggravated by fatty foods.  Duration many months, progressively becoming worse in pattern of eating becoming more consistent.  Recent right upper quadrant ultrasound showed the following:    ULTRASOUND ABDOMEN COMPLETE  6/28/2018 7:37 PM     HISTORY: Right upper quadrant pain.     COMPARISON: None.     FINDINGS: The exam is limited related to patient body habitus and  prominent overlying bowel gas. The visualized portions of the liver  are unremarkable, without evidence for hepatic mass or fatty  infiltration. There appear to be prominent shadowing gallstones within  the gallbladder. No gallbladder wall thickening or pericholecystic  fluid. Negative sonographic Arzola sign. No intrahepatic biliary  dilatation is seen. The common duct measures near the upper limits of  normal at 0.7 cm. The common duct could not be visualized in its  entirety. Pancreas is partially obscured by overlying bowel gas, but  appears unremarkable where seen. Unremarkable spleen. Both kidneys are  unremarkable. No hydronephrosis. Abdominal aorta and IVC are  segmentally seen, and are of normal caliber where visualized.         IMPRESSION:   1. Cholelithiasis. No other sonographic evidence for cholecystitis.   2. The common duct measures near the upper limits of normal at 0.7 cm.  3. The exam is somewhat limited related to patient body habitus and  prominent overlying bowel gas.      MADISYN QUIROZ MD        See problem list for active medical problems.  Problems all longstanding and stable, except as noted/documented.  See ROS for pertinent symptoms related to these conditions.                                                                                                                                                           .    MEDICAL HISTORY:     Patient Active Problem List    Diagnosis Date Noted     Overflow incontinence 03/30/2018     Priority: Medium     Elevated liver function tests 02/19/2018     Priority: Medium     Advanced directives, counseling/discussion 09/22/2017     Priority: Medium     Advance Care Planning 9/22/2017: ACP Review of Chart / Resources Provided:  Reviewed chart for advance care plan.  Natalie Holt has been provided information and resources to begin or update their advance care plan.  Added by Jackelyn Shine             History of colonic polyps 09/22/2017     Priority: Medium     Morbid obesity due to excess calories (H) 09/22/2017     Priority: Medium     Hypothyroidism due to acquired atrophy of thyroid 04/25/2017     Priority: Medium     Elevated glucose 04/25/2017     Priority: Medium     Vitamin D deficiency 04/25/2017     Priority: Medium     Tubular adenoma of colon 08/21/2012     Priority: Medium     Tubular adenoma on colonoscopy on 8-2012. Recommend next colonoscopy in 8-2017        Other symptoms involving nervous and musculoskeletal systems(781.99) 08/10/2012     Priority: Medium     CARDIOVASCULAR SCREENING; LDL GOAL LESS THAN 160 05/09/2010     Priority: Medium     Abnormal Pap smear 03/02/2010     Priority: Medium     Endometrial cells on most recent Pap smear. Endometrial biopsy recommended.       Post-menopausal bleeding 01/22/2010     Priority: Medium     24 hour contact handout given 07/27/2012     Priority: Low     EMERGENCY CARE PLAN  Presenting Problem Signs and Symptoms Treatment Plan    Questions or conerns during clinic hours    I will call the clinic directly     Questions or conerns outside clinic hours    I will call the 24 hour nurse line at [phone]    Patient needs to schedule an appointment    I will call the 24 hour scheduling team at [phone] or clinic directly    Same day treatment     I will call the clinic first, nurse line if after hours, urgent care and  "express care if needed                                  Past Medical History:   Diagnosis Date     Anxiety      CAD (coronary artery disease) 4/29/2011     Generalised anxiety disorder 1/12/2010     Hypothyroidism      Post-menopausal bleeding 1/22/2010     Past Surgical History:   Procedure Laterality Date     C ANESTH,ANKLE REPLACEMENT      Left ankle, 3 pin inserted.     C ANESTH,ELBOW AREA SURGERY      Nasim inserted into her left elbow.     C FACIAL REPLACEMENT      Repair to the right side of her temple.     C SOLID INSERT,FOAM,HOOKON HARDWARE      Removal of 3 pins from her Left ankle     Current Outpatient Prescriptions   Medication Sig Dispense Refill     levothyroxine (SYNTHROID/LEVOTHROID) 25 MCG tablet Take 1 tablet (25 mcg) by mouth daily 90 tablet 2     OTC products: none    Allergies   Allergen Reactions     Nkda [No Known Drug Allergies]       Latex Allergy: NO    Social History   Substance Use Topics     Smoking status: Never Smoker     Smokeless tobacco: Never Used      Comment: Social smoker on rare occsions.     Alcohol use Yes      Comment: Occasional beer per week.     History   Drug Use No       REVIEW OF SYSTEMS:   Constitutional, HEENT, cardiovascular, pulmonary, gi and gu systems are negative, except as otherwise noted.    EXAM:   /80  Pulse 68  Temp 98.3  F (36.8  C) (Tympanic)  Ht 5' 3.5\" (1.613 m)  Wt 224 lb 4 oz (101.7 kg)  BMI 39.1 kg/m2  GENERAL: Healthy, alert and no distress  EYES: Eyes grossly normal to inspection, conjunctivae and sclerae normal  RESP: Lungs clear to auscultation - no rales, rhonchi or wheezes  CV: Regular rate and rhythm, normal S1 S2, no murmur  MS: No gross musculoskeletal defects noted, no edema  NEURO: Normal strength and tone, mentation intact and speech normal  PSYCH: Mentation appears normal, affect normal/bright     DIAGNOSTICS:   EKG: appears normal, NSR, normal axis, normal intervals, no acute ST/T changes c/w ischemia, no LVH by voltage " criteria    Recent Labs   Lab Test  02/19/18   1424  06/28/17   0840  06/29/16   1028   05/05/13   1429   HGB   --    --   14.1   --   15.7   PLT   --    --   218   --   234   NA  136  135  137   < >  143   POTASSIUM  4.2  3.7  3.9   < >  4.2   CR  0.70  0.58  0.70   < >  0.60   A1C  5.5  5.5   --    < >   --     < > = values in this interval not displayed.      Results for orders placed or performed in visit on 08/29/18   CBC with platelets   Result Value Ref Range    WBC 7.1 4.0 - 11.0 10e9/L    RBC Count 4.79 3.8 - 5.2 10e12/L    Hemoglobin 14.3 11.7 - 15.7 g/dL    Hematocrit 43.1 35.0 - 47.0 %    MCV 90 78 - 100 fl    MCH 29.9 26.5 - 33.0 pg    MCHC 33.2 31.5 - 36.5 g/dL    RDW 12.8 10.0 - 15.0 %    Platelet Count 202 150 - 450 10e9/L   TSH   Result Value Ref Range    TSH 1.61 0.40 - 4.00 mU/L   Comprehensive metabolic panel (BMP + Alb, Alk Phos, ALT, AST, Total. Bili, TP)   Result Value Ref Range    Sodium 137 133 - 144 mmol/L    Potassium 3.5 3.4 - 5.3 mmol/L    Chloride 102 94 - 109 mmol/L    Carbon Dioxide 27 20 - 32 mmol/L    Anion Gap 8 3 - 14 mmol/L    Glucose 91 70 - 99 mg/dL    Urea Nitrogen 14 7 - 30 mg/dL    Creatinine 0.67 0.52 - 1.04 mg/dL    GFR Estimate 89 >60 mL/min/1.7m2    GFR Estimate If Black >90 >60 mL/min/1.7m2    Calcium 8.5 8.5 - 10.1 mg/dL    Bilirubin Total 1.4 (H) 0.2 - 1.3 mg/dL    Albumin 3.9 3.4 - 5.0 g/dL    Protein Total 8.0 6.8 - 8.8 g/dL    Alkaline Phosphatase 266 (H) 40 - 150 U/L     (HH) 0 - 50 U/L     (HH) 0 - 45 U/L      IMPRESSION:       The proposed surgical procedure is considered INTERMEDIATE risk.    REVISED CARDIAC RISK INDEX  The patient has the following serious cardiovascular risks for perioperative complications such as (MI, PE, VFib and 3  AV Block):  No serious cardiac risks  INTERPRETATION: 0 risks: Class I (very low risk - 0.4% complication rate)    The patient has the following additional risks for perioperative complications:  No identified  additional risks    (Z01.818) Preop general physical exam  (primary encounter diagnosis)      (K80.20) Gallstones  Comment: Classic symptoms for symptomatic cholelithiasis.  Ultrasound confirms the presence of gallstones.  Reviewed options, encourage the patient to pursue surgical treatment.  Plan: EKG 12-lead complete w/read - Clinics, CBC with        platelets, Comprehensive metabolic panel (BMP +        Alb, Alk Phos, ALT, AST, Total. Bili, TP),         GENERAL SURG ADULT REFERRAL          (E03.4) Hypothyroidism due to acquired atrophy of thyroid  Comment: Euthyroid on exam.  TSH normal.  Plan: TSH      (R74.8) Elevated liver enzymes  Comment: Working diagnosis would be biliary tree obstruction secondary to gallstones.  This would be supported by the presence of gallstones, and the common bile duct showing borderline dilatation.  Plan: I would recommend removal of her gallbladder and then close monitoring of liver function tests.  Should be declining relatively rapidly if this is an obstructive process.  No clinical symptoms of ascending cholangitis.  Patient does not appear ill at all.  Will contact the patient and order an INR.  If this is normal, may proceed with surgery.  If abnormal, she will need a more extensive liver workup prior to surgery.          RECOMMENDATIONS:         --Patient is to take all scheduled medications on the day of surgery EXCEPT for modifications listed below.    Anticoagulant or Antiplatelet Medication Use  ASPIRIN: Discontinue ASA 7-10 days prior to procedure to reduce bleeding risk.  It should be resumed post-operatively.  NSAIDS: Ibuprofen (Motrin):         Stop one day prior to surgery        APPROVAL GIVEN to proceed with proposed procedure, without further diagnostic evaluation       Signed Electronically by: Jenny Julian MD    Copy of this evaluation report is provided to requesting physician.    [place] Preop Guidelines    Revised Cardiac Risk Index

## 2018-09-25 DIAGNOSIS — E03.4 HYPOTHYROIDISM DUE TO ACQUIRED ATROPHY OF THYROID: ICD-10-CM

## 2018-09-25 RX ORDER — LEVOTHYROXINE SODIUM 25 UG/1
TABLET ORAL
Qty: 90 TABLET | Refills: 1 | Status: SHIPPED | OUTPATIENT
Start: 2018-09-25 | End: 2019-01-08

## 2018-09-25 NOTE — TELEPHONE ENCOUNTER
"Requested Prescriptions   Pending Prescriptions Disp Refills     levothyroxine (SYNTHROID/LEVOTHROID) 25 MCG tablet [Pharmacy Med Name: LEVOTHYROXINE SODIUM 25MCG TABS] 90 tablet 2    Last Written Prescription Date:  02/19/2018 #90 x 2  Last filled 02/26/2018  Last office visit: 8/29/2018 DEXTER Julian   Future Office Visit: None    Sig: TAKE ONE TABLET BY MOUTH EVERY DAY    Thyroid Protocol Passed    9/25/2018 11:31 AM       Passed - Patient is 12 years or older       Passed - Recent (12 mo) or future (30 days) visit within the authorizing provider's specialty    Patient had office visit in the last 12 months or has a visit in the next 30 days with authorizing provider or within the authorizing provider's specialty.  See \"Patient Info\" tab in inbasket, or \"Choose Columns\" in Meds & Orders section of the refill encounter.           Passed - Normal TSH on file in past 12 months    Recent Labs   Lab Test  08/29/18   1446   TSH  1.61             Passed - No active pregnancy on record    If patient is pregnant or has had a positive pregnancy test, please check TSH.         Passed - No positive pregnancy test in past 12 months    If patient is pregnant or has had a positive pregnancy test, please check TSH.            "

## 2018-10-01 DIAGNOSIS — K81.0 ACUTE CHOLECYSTITIS: ICD-10-CM

## 2018-10-01 DIAGNOSIS — R79.89 ELEVATED LIVER FUNCTION TESTS: Primary | ICD-10-CM

## 2018-10-18 DIAGNOSIS — R79.89 ELEVATED LIVER FUNCTION TESTS: ICD-10-CM

## 2018-10-18 DIAGNOSIS — K81.0 ACUTE CHOLECYSTITIS: ICD-10-CM

## 2018-10-18 LAB
ALBUMIN SERPL-MCNC: 3.9 G/DL (ref 3.4–5)
ALP SERPL-CCNC: 94 U/L (ref 40–150)
ALT SERPL W P-5'-P-CCNC: 26 U/L (ref 0–50)
AST SERPL W P-5'-P-CCNC: 24 U/L (ref 0–45)
BILIRUB DIRECT SERPL-MCNC: 0.2 MG/DL (ref 0–0.2)
BILIRUB SERPL-MCNC: 0.6 MG/DL (ref 0.2–1.3)
ERYTHROCYTE [DISTWIDTH] IN BLOOD BY AUTOMATED COUNT: 12.4 % (ref 10–15)
HCT VFR BLD AUTO: 41.6 % (ref 35–47)
HGB BLD-MCNC: 13.9 G/DL (ref 11.7–15.7)
INR PPP: 0.98 (ref 0.86–1.14)
MCH RBC QN AUTO: 30.2 PG (ref 26.5–33)
MCHC RBC AUTO-ENTMCNC: 33.4 G/DL (ref 31.5–36.5)
MCV RBC AUTO: 90 FL (ref 78–100)
PLATELET # BLD AUTO: 228 10E9/L (ref 150–450)
PROT SERPL-MCNC: 8 G/DL (ref 6.8–8.8)
RBC # BLD AUTO: 4.61 10E12/L (ref 3.8–5.2)
WBC # BLD AUTO: 7.7 10E9/L (ref 4–11)

## 2018-10-18 PROCEDURE — 80076 HEPATIC FUNCTION PANEL: CPT | Performed by: FAMILY MEDICINE

## 2018-10-18 PROCEDURE — 85027 COMPLETE CBC AUTOMATED: CPT | Performed by: FAMILY MEDICINE

## 2018-10-18 PROCEDURE — 36415 COLL VENOUS BLD VENIPUNCTURE: CPT | Performed by: FAMILY MEDICINE

## 2018-10-18 PROCEDURE — 85610 PROTHROMBIN TIME: CPT | Performed by: FAMILY MEDICINE

## 2018-10-23 ENCOUNTER — ALLIED HEALTH/NURSE VISIT (OUTPATIENT)
Dept: FAMILY MEDICINE | Facility: CLINIC | Age: 63
End: 2018-10-23
Payer: COMMERCIAL

## 2018-10-23 ENCOUNTER — OFFICE VISIT (OUTPATIENT)
Dept: FAMILY MEDICINE | Facility: CLINIC | Age: 63
End: 2018-10-23
Payer: COMMERCIAL

## 2018-10-23 ENCOUNTER — TELEPHONE (OUTPATIENT)
Dept: FAMILY MEDICINE | Facility: CLINIC | Age: 63
End: 2018-10-23

## 2018-10-23 VITALS
TEMPERATURE: 97.7 F | HEIGHT: 64 IN | SYSTOLIC BLOOD PRESSURE: 136 MMHG | HEART RATE: 74 BPM | DIASTOLIC BLOOD PRESSURE: 74 MMHG | WEIGHT: 229.3 LBS | BODY MASS INDEX: 39.15 KG/M2

## 2018-10-23 DIAGNOSIS — Z53.9 ERRONEOUS ENCOUNTER--DISREGARD: Primary | ICD-10-CM

## 2018-10-23 DIAGNOSIS — S13.4XXA WHIPLASH INJURIES, INITIAL ENCOUNTER: Primary | ICD-10-CM

## 2018-10-23 DIAGNOSIS — S46.911A SHOULDER STRAIN, RIGHT, INITIAL ENCOUNTER: ICD-10-CM

## 2018-10-23 PROCEDURE — 99214 OFFICE O/P EST MOD 30 MIN: CPT | Performed by: FAMILY MEDICINE

## 2018-10-23 RX ORDER — CYCLOBENZAPRINE HCL 10 MG
10 TABLET ORAL 3 TIMES DAILY PRN
Qty: 30 TABLET | Refills: 0 | Status: SHIPPED | OUTPATIENT
Start: 2018-10-23 | End: 2021-08-18
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
## 2019-01-08 ENCOUNTER — TELEPHONE (OUTPATIENT)
Dept: FAMILY MEDICINE | Facility: CLINIC | Age: 64
End: 2019-01-08

## 2019-01-08 DIAGNOSIS — E03.4 HYPOTHYROIDISM DUE TO ACQUIRED ATROPHY OF THYROID: ICD-10-CM

## 2019-01-08 RX ORDER — LEVOTHYROXINE SODIUM 25 UG/1
25 TABLET ORAL DAILY
Qty: 90 TABLET | Refills: 0 | Status: SHIPPED | OUTPATIENT
Start: 2019-01-08 | End: 2019-10-11

## 2019-01-08 NOTE — TELEPHONE ENCOUNTER
Prescription approved per [facility] Refill Protocol.  Neetu Harrington RN

## 2019-01-08 NOTE — TELEPHONE ENCOUNTER
Pt is calling and states that she is in [facility] until the end of march and is asking if we can send over a 90 day supply of her synthropid medication.

## 2019-10-25 ENCOUNTER — OFFICE VISIT (OUTPATIENT)
Dept: FAMILY MEDICINE | Facility: CLINIC | Age: 64
End: 2019-10-25
Payer: COMMERCIAL

## 2019-10-25 VITALS
RESPIRATION RATE: 16 BRPM | SYSTOLIC BLOOD PRESSURE: 124 MMHG | HEART RATE: 72 BPM | DIASTOLIC BLOOD PRESSURE: 72 MMHG | HEIGHT: 64 IN | BODY MASS INDEX: 40.33 KG/M2 | WEIGHT: 236.2 LBS | TEMPERATURE: 98.1 F

## 2019-10-25 DIAGNOSIS — K80.80 BILIARY CALCULUS OF OTHER SITE WITHOUT OBSTRUCTION: ICD-10-CM

## 2019-10-25 DIAGNOSIS — E03.4 HYPOTHYROIDISM DUE TO ACQUIRED ATROPHY OF THYROID: Primary | ICD-10-CM

## 2019-10-25 DIAGNOSIS — R74.8 ELEVATED LIVER ENZYMES: ICD-10-CM

## 2019-10-25 DIAGNOSIS — Z12.31 ENCOUNTER FOR SCREENING MAMMOGRAM FOR MALIGNANT NEOPLASM OF BREAST: ICD-10-CM

## 2019-10-25 DIAGNOSIS — Z12.11 SPECIAL SCREENING FOR MALIGNANT NEOPLASMS, COLON: ICD-10-CM

## 2019-10-25 LAB
ALBUMIN SERPL-MCNC: 3.9 G/DL (ref 3.4–5)
ALP SERPL-CCNC: 82 U/L (ref 40–150)
ALT SERPL W P-5'-P-CCNC: 22 U/L (ref 0–50)
ANION GAP SERPL CALCULATED.3IONS-SCNC: 5 MMOL/L (ref 3–14)
AST SERPL W P-5'-P-CCNC: 19 U/L (ref 0–45)
BILIRUB SERPL-MCNC: 0.4 MG/DL (ref 0.2–1.3)
BUN SERPL-MCNC: 16 MG/DL (ref 7–30)
CALCIUM SERPL-MCNC: 9 MG/DL (ref 8.5–10.1)
CHLORIDE SERPL-SCNC: 104 MMOL/L (ref 94–109)
CO2 SERPL-SCNC: 30 MMOL/L (ref 20–32)
CREAT SERPL-MCNC: 0.72 MG/DL (ref 0.52–1.04)
GFR SERPL CREATININE-BSD FRML MDRD: 90 ML/MIN/{1.73_M2}
GLUCOSE SERPL-MCNC: 90 MG/DL (ref 70–99)
POTASSIUM SERPL-SCNC: 4 MMOL/L (ref 3.4–5.3)
PROT SERPL-MCNC: 8.1 G/DL (ref 6.8–8.8)
SODIUM SERPL-SCNC: 139 MMOL/L (ref 133–144)
TSH SERPL DL<=0.005 MIU/L-ACNC: 2.67 MU/L (ref 0.4–4)

## 2019-10-25 PROCEDURE — 36415 COLL VENOUS BLD VENIPUNCTURE: CPT | Performed by: NURSE PRACTITIONER

## 2019-10-25 PROCEDURE — 80053 COMPREHEN METABOLIC PANEL: CPT | Performed by: NURSE PRACTITIONER

## 2019-10-25 PROCEDURE — 99213 OFFICE O/P EST LOW 20 MIN: CPT | Performed by: NURSE PRACTITIONER

## 2019-10-25 PROCEDURE — 84443 ASSAY THYROID STIM HORMONE: CPT | Performed by: NURSE PRACTITIONER

## 2019-10-25 RX ORDER — LEVOTHYROXINE SODIUM 25 UG/1
25 TABLET ORAL DAILY
Qty: 90 TABLET | Refills: 1 | Status: SHIPPED | OUTPATIENT
Start: 2019-10-25 | End: 2020-05-06

## 2019-10-25 ASSESSMENT — MIFFLIN-ST. JEOR: SCORE: 1608.46

## 2019-10-25 ASSESSMENT — PAIN SCALES - GENERAL: PAINLEVEL: NO PAIN (0)

## 2019-10-25 NOTE — PATIENT INSTRUCTIONS
You are looking good.    Enjoy [place] !!!    You did get your blood work was done today      your medication was sent to your pharmacy in [place]

## 2019-10-25 NOTE — LETTER
October 28, 2019      Natalie Holt  [address] [address]        Dear ,    We are writing to inform you of your test results.    These results are normal.    Resulted Orders   TSH   Result Value Ref Range    TSH 2.67 0.40 - 4.00 mU/L   Comprehensive metabolic panel   Result Value Ref Range    Sodium 139 133 - 144 mmol/L    Potassium 4.0 3.4 - 5.3 mmol/L    Chloride 104 94 - 109 mmol/L    Carbon Dioxide 30 20 - 32 mmol/L    Anion Gap 5 3 - 14 mmol/L    Glucose 90 70 - 99 mg/dL      Comment:      Non Fasting    Urea Nitrogen 16 7 - 30 mg/dL    Creatinine 0.72 0.52 - 1.04 mg/dL    GFR Estimate 90 >60 mL/min/[1.73_m2]      Comment:      Non  GFR Calc  Starting 12/18/2018, serum creatinine based estimated GFR (eGFR) will be   calculated using the Chronic Kidney Disease Epidemiology Collaboration   (CKD-EPI) equation.      GFR Estimate If Black >90 >60 mL/min/[1.73_m2]      Comment:       GFR Calc  Starting 12/18/2018, serum creatinine based estimated GFR (eGFR) will be   calculated using the Chronic Kidney Disease Epidemiology Collaboration   (CKD-EPI) equation.      Calcium 9.0 8.5 - 10.1 mg/dL    Bilirubin Total 0.4 0.2 - 1.3 mg/dL    Albumin 3.9 3.4 - 5.0 g/dL    Protein Total 8.1 6.8 - 8.8 g/dL    Alkaline Phosphatase 82 40 - 150 U/L    ALT 22 0 - 50 U/L    AST 19 0 - 45 U/L       If you have any questions or concerns, please call the clinic at the number listed above.       Sincerely,        RUBI PARK NP, APRN CNP/am

## 2019-10-25 NOTE — PROGRESS NOTES
Subjective     Natalie Holt is a 64 year old female who presents to clinic today for the following health issues:    HPI     Hypothyroidism Follow-up      Since last visit, patient describes the following symptoms: Weight stable, no hair loss, no skin changes, no constipation, no loose stools        Patient Active Problem List   Diagnosis     Post-menopausal bleeding     Abnormal Pap smear     CARDIOVASCULAR SCREENING; LDL GOAL LESS THAN 160     24 hour contact handout given     Other symptoms involving nervous and musculoskeletal systems(781.99)     Tubular adenoma of colon     Hypothyroidism due to acquired atrophy of thyroid     Elevated glucose     Vitamin D deficiency     Advanced directives, counseling/discussion     History of colonic polyps     Morbid obesity due to excess calories (H)     Elevated liver function tests     Overflow incontinence     Past Surgical History:   Procedure Laterality Date     C ANESTH,ANKLE REPLACEMENT      Left ankle, 3 pin inserted.     C ANESTH,ELBOW AREA SURGERY      Nasim inserted into her left elbow.     C FACIAL REPLACEMENT      Repair to the right side of her temple.     C SOLID INSERT,FOAM,HOOKON HARDWARE      Removal of 3 pins from her Left ankle       Social History     Tobacco Use     Smoking status: Never Smoker     Smokeless tobacco: Never Used     Tobacco comment: Social smoker on rare occsions.   Substance Use Topics     Alcohol use: Yes     Comment: Occasional beer per week.     Family History   Problem Relation Age of Onset     Gynecology Daughter         Cyst on her ovary         Current Outpatient Medications   Medication Sig Dispense Refill     levothyroxine (SYNTHROID/LEVOTHROID) 25 MCG tablet Take 1 tablet (25 mcg) by mouth daily 90 tablet 1     cyclobenzaprine (FLEXERIL) 10 MG tablet Take 1 tablet (10 mg) by mouth 3 times daily as needed for muscle spasms (Patient not taking: Reported on 10/25/2019) 30 tablet 0     Allergies   Allergen Reactions     Nkda  "[No Known Drug Allergies]      BP Readings from Last 3 Encounters:   10/25/19 124/72   10/23/18 136/74   08/29/18 114/80    Wt Readings from Last 3 Encounters:   10/25/19 107.1 kg (236 lb 3.2 oz)   10/23/18 104 kg (229 lb 4.8 oz)   08/29/18 101.7 kg (224 lb 4 oz)                      Reviewed and updated as needed this visit by Provider  Tobacco  Allergies  Meds  Med Hx  Surg Hx  Fam Hx  Soc Hx        Review of Systems   ROS COMP: CONSTITUTIONAL: NEGATIVE for fever, chills, change in weight  ENT/MOUTH: NEGATIVE for ear, mouth and throat problems  RESP: NEGATIVE for significant cough or SOB  CV: NEGATIVE for chest pain, palpitations or peripheral edema  GI: NEGATIVE for nausea, abdominal pain, heartburn, or change in bowel habits  ENDOCRINE: NEGATIVE for temperature intolerance, skin/hair changes  PSYCHIATRIC: NEGATIVE for changes in mood or affect,  Is excited about  Going to [place]       Objective    /72 (BP Location: Left arm, Patient Position: Chair, Cuff Size: Adult Large)   Pulse 72   Temp 98.1  F (36.7  C) (Tympanic)   Resp 16   Ht 1.613 m (5' 3.5\")   Wt 107.1 kg (236 lb 3.2 oz)   BMI 41.18 kg/m    Body mass index is 41.18 kg/m .  Physical Exam   GENERAL: healthy, alert and no distress  HENT: ear canals and TM's normal, nose and mouth without ulcers or lesions  NECK: no adenopathy, no asymmetry, masses, or scars and thyroid normal to palpation  RESP: lungs clear to auscultation - no rales, rhonchi or wheezes  CV: regular rate and rhythm, normal S1 S2, no S3 or S4, no murmur, click or rub, no peripheral edema and peripheral pulses strong  ABDOMEN: soft, nontender, no hepatosplenomegaly, no masses and bowel sounds normal  PSYCH: mentation appears normal, affect normal/bright    Diagnostic Test Results:  Labs reviewed in Epic  Pending         ASSESSMENT/PLAN:      ICD-10-CM    1. Hypothyroidism due to acquired atrophy of thyroid E03.4 levothyroxine (SYNTHROID/LEVOTHROID) 25 MCG tablet     TSH "   2. Encounter for screening mammogram for malignant neoplasm of breast Z12.31 MA SCREENING DIGITAL BILAT - Future  (s+30)   3. Special screening for malignant neoplasms, colon Z12.11 GASTROENTEROLOGY ADULT REF PROCEDURE ONLY None   4. Elevated liver enzymes R74.8 Comprehensive metabolic panel   5. Biliary calculus of other site without obstruction K80.80 Comprehensive metabolic panel       Patient Instructions   You are looking good.    Enjoy [place] !!!    You did get your blood work was done today      your medication was sent to your pharmacy in [place]

## 2019-11-21 ENCOUNTER — TELEPHONE (OUTPATIENT)
Dept: FAMILY MEDICINE | Facility: CLINIC | Age: 64
End: 2019-11-21

## 2019-11-21 NOTE — TELEPHONE ENCOUNTER
Panel Management Review      Patient has the following on her problem list: None      Composite cancer screening  Chart review shows that this patient is due/due soon for the following Mammogram and Colonoscopy  Summary:    Patient is due/failing the following:   PE; Mammogram, Colonoscopy, Shingrix    Action needed:   Patient needs office visit for PE with fasting LABS and updated IMMUNIZATIONS and Patient needs referral/order: MAMMOGRAM and COLONOSCOPY    Type of outreach:    Sent letter.    Questions for provider review:    None                                                                                                                                    Jackelyn Dc CMA (AAMA)       Chart routed to None .

## 2019-11-21 NOTE — LETTER
November 21, 2019      Natalie Holt  [address] [address]      Dear Natalie Holt    We care about your health and have reviewed your health plan including your medical conditions, medication list, and lab results.  Based on this review, it is recommended that you follow up regarding the following health topic(s):     -Breast Cancer Screening  -Colon Cancer Screening  -Wellness (Physical) Visit   -Immunizations    We recommend you take the following action(s):    -- Shingles vaccine. This is recommended for your age group. To receive this vaccine please call to schedule a nurse appointment or, if it is more convenient for you, our pharmacy takes walk ins for this also.     -- Physical. Please call our clinic to schedule your physical appointment. Please plan to be fasting for 8 to 10 hours prior to this appointment (nothing to eat or drink except water and medications).     -- Mammogram. The last mammogram that we have on file for you was from 04/25/2017.   Please call one of the following numbers to schedule:  ** [facility] [phone] (at [facility] once a month)  [facility] [phone]  [facility] [phone]  [facility] [phone]  [facility] [phone]  [facility] [phone]    -- Colon screen. Colonoscopy or FIT test (take home test). One of these tests is recommended at age 50 to screen for colon cancer. The last colonoscopy/FIT that we have on file for you was from 08/17/2012.   Please call one of the following numbers to schedule a colonoscopy:  [facility] [phone]  [facility] [phone]  [facility] [phone]  [facility] [phone] (multiple sites, call for locations)  OR....  If you prefer to do a screening that is LESS INVASIVE AND LESS EXPENSIVE there is an test for you! It is called the FIT test. It is a screening test that is done yearly and can be DONE AT HOME! Do the test  at home and mail it in (you don't even have to pay for postage). If you are willing to do this test, we can order the kit for you to  at our clinic. Please call us at [phone] if you need an order for a colonoscopy or FIT testing.    Please call us at [phone] (or use [employer]) to address the above recommendations.     Thank you for trusting [facility] and we appreciate the opportunity to serve you.  We look forward to supporting your healthcare needs in the future.    Healthy Regards,      Your Health Care Team  [facility]

## 2020-05-04 DIAGNOSIS — E03.4 HYPOTHYROIDISM DUE TO ACQUIRED ATROPHY OF THYROID: ICD-10-CM

## 2020-05-06 RX ORDER — LEVOTHYROXINE SODIUM 25 UG/1
TABLET ORAL
Qty: 90 TABLET | Refills: 2 | Status: SHIPPED | OUTPATIENT
Start: 2020-05-06 | End: 2021-03-01

## 2020-05-06 NOTE — TELEPHONE ENCOUNTER
TSH   Date Value Ref Range Status   10/25/2019 2.67 0.40 - 4.00 mU/L Final       Prescription approved per [facility] Refill Protocol or patient Primary care provider (PCP) Chart and last OV reviewed   ANAIS Christian  RN/Andrew Silva

## 2020-05-28 ENCOUNTER — VIRTUAL VISIT (OUTPATIENT)
Dept: FAMILY MEDICINE | Facility: CLINIC | Age: 65
End: 2020-05-28
Payer: COMMERCIAL

## 2020-05-28 DIAGNOSIS — L08.9 LOCAL INFECTION OF SKIN AND SUBCUTANEOUS TISSUE: Primary | ICD-10-CM

## 2020-05-28 PROCEDURE — 99213 OFFICE O/P EST LOW 20 MIN: CPT | Mod: 95 | Performed by: NURSE PRACTITIONER

## 2020-05-28 RX ORDER — SULFAMETHOXAZOLE/TRIMETHOPRIM 800-160 MG
1 TABLET ORAL 2 TIMES DAILY
Qty: 20 TABLET | Refills: 0 | Status: SHIPPED | OUTPATIENT
Start: 2020-05-28 | End: 2021-08-18

## 2020-05-28 NOTE — PATIENT INSTRUCTIONS
Keep the   Lesion clean and dry   Can apply a band-aid.   Hold the liquid band-aid that you have been applying     Remember the antibiotic will make your skin sun sensitive  Avoid the  Sun,  Protect the skin when in the sun.   Sun screen     Do not pick at the lesion  2     If not improving let me know

## 2020-05-28 NOTE — PROGRESS NOTES
"Natalie Holt is a 65 year old female who is being evaluated via a billable video visit.      The patient has been notified of following:     \"This video visit will be conducted via a call between you and your physician/provider. We have found that certain health care needs can be provided without the need for an in-person physical exam.  This service lets us provide the care you need with a video conversation.  If a prescription is necessary we can send it directly to your pharmacy.  If lab work is needed we can place an order for that and you can then stop by our lab to have the test done at a later time.    Video visits are billed at different rates depending on your insurance coverage.  Please reach out to your insurance provider with any questions.    If during the course of the call the physician/provider feels a video visit is not appropriate, you will not be charged for this service.\"    Patient has given verbal consent for Video visit? Yes    How would you like to obtain your AVS? Pt. Doesn't need one    Patient would like the video invitation sent by: Text to cell phone: [phone]    Will anyone else be joining your video visit? No      Subjective     Natalie Holt is a 65 year old female who presents today via video visit for the following health issues:    HPI  Rash  Onset: X 1 1/2 weeks    Description:   Location: left lower chin  Character: round, raised, red  Itching (Pruritis): no     Progression of Symptoms:  improving    Accompanying Signs & Symptoms:  Fever: no   Body aches or joint pain: no   Sore throat symptoms: no   Recent cold symptoms: no     History:   Previous similar rash: no     Precipitating factors:   Exposure to similar rash: no   New exposures: None   Recent travel: YES- pt. Was in [place] and drove home, different states    Alleviating factors:  OTC antibiotic ointments    Therapies Tried and outcome: OTC antibiotic ointments, liquid Band-Aid and Advil         Video Start " Time: 2:45 PM        Patient Active Problem List   Diagnosis     Post-menopausal bleeding     Abnormal Pap smear     CARDIOVASCULAR SCREENING; LDL GOAL LESS THAN 160     24 hour contact handout given     Other symptoms involving nervous and musculoskeletal systems(781.99)     Tubular adenoma of colon     Hypothyroidism due to acquired atrophy of thyroid     Elevated glucose     Vitamin D deficiency     Advanced directives, counseling/discussion     History of colonic polyps     Morbid obesity due to excess calories (H)     Elevated liver function tests     Overflow incontinence     Past Surgical History:   Procedure Laterality Date     C ANESTH,ANKLE REPLACEMENT      Left ankle, 3 pin inserted.     C ANESTH,ELBOW AREA SURGERY      Nasim inserted into her left elbow.     C FACIAL REPLACEMENT      Repair to the right side of her temple.     C SOLID INSERT,FOAM,HOOKON HARDWARE      Removal of 3 pins from her Left ankle       Social History     Tobacco Use     Smoking status: Never Smoker     Smokeless tobacco: Never Used     Tobacco comment: Social smoker on rare occsions.   Substance Use Topics     Alcohol use: Yes     Comment: Occasional beer per week.     Family History   Problem Relation Age of Onset     Gynecology Daughter         Cyst on her ovary         Current Outpatient Medications   Medication Sig Dispense Refill     levothyroxine (SYNTHROID/LEVOTHROID) 25 MCG tablet Take 1 tablet by mouth once daily 90 tablet 2     cyclobenzaprine (FLEXERIL) 10 MG tablet Take 1 tablet (10 mg) by mouth 3 times daily as needed for muscle spasms (Patient not taking: Reported on 10/25/2019) 30 tablet 0     Allergies   Allergen Reactions     Nkda [No Known Drug Allergies]      BP Readings from Last 3 Encounters:   10/25/19 124/72   10/23/18 136/74   08/29/18 114/80    Wt Readings from Last 3 Encounters:   10/25/19 107.1 kg (236 lb 3.2 oz)   10/23/18 104 kg (229 lb 4.8 oz)   08/29/18 101.7 kg (224 lb 4 oz)                 "    Reviewed and updated as needed this visit by Provider         Review of Systems   CONSTITUTIONAL: NEGATIVE for fever, chills, change in weight  INTEGUMENTARY/SKIN: POSITIVE for scab on the  Left side of chin.  Has been putting antibiotic cream on a skin lesion she feels it is getting better,  Her children feels it is getting worse,    It is the size of a quarter. Isn't sure as to what happened.   The lesion started out being bumpy.    ENT/MOUTH: NEGATIVE for ear, mouth and throat problems  RESP: NEGATIVE for significant cough or SOB  CV: NEGATIVE for chest pain, palpitations or peripheral edema      Objective    There were no vitals taken for this visit.  Estimated body mass index is 41.18 kg/m  as calculated from the following:    Height as of 10/25/19: 1.613 m (5' 3.5\").    Weight as of 10/25/19: 107.1 kg (236 lb 3.2 oz).  Physical Exam     GENERAL: Healthy, alert and no distress  RESP: No audible wheeze, cough, or visible cyanosis.  No visible retractions or increased work of breathing.    SKIN:skin lesion = there is a quart sized sore on the  Left side of chin.   It is crusted,  Does have what appears and confirmed by daughter small amount of drainage. ,   The middle is crusted with the edges appearing to be moist .    T he erythema does appear to be contained just around the lesion    PSYCH: Mentation appears normal, affect normal/bright, judgement and insight intact, normal speech and appearance well-groomed.      Diagnostic Test Results:  Labs reviewed in Epic  none       ASSESSMENT/PLAN:      ICD-10-CM    1. Local infection of skin and subcutaneous tissue  L08.9 sulfamethoxazole-trimethoprim (BACTRIM DS) 800-160 MG tablet       There are no Patient Instructions on file for this visit.            Video-Visit Details    Type of service:  Video Visit    Video End Time:2:55 PM    Originating Location (pt. Location): Home    Distant Location (provider location):  [facility]     Platform used " for Video Visit: [facility]    No follow-ups on file.

## 2021-03-01 DIAGNOSIS — E03.4 HYPOTHYROIDISM DUE TO ACQUIRED ATROPHY OF THYROID: ICD-10-CM

## 2021-03-01 RX ORDER — LEVOTHYROXINE SODIUM 25 UG/1
25 TABLET ORAL DAILY
Qty: 30 TABLET | Refills: 0 | Status: SHIPPED | OUTPATIENT
Start: 2021-03-01 | End: 2021-04-15

## 2021-03-02 NOTE — TELEPHONE ENCOUNTER
Pt is calling.    Needs refill of Levothyroxine. Currently in [place].  Needs is sent to [facility] on [address] in [facility].  I advised her that I would send in her refill request     Heide Tate RN  [facility] Nurse Advisor  3/1/2021 at 6:32 PM

## 2021-03-02 NOTE — TELEPHONE ENCOUNTER
Left message on voice mail for patient to call clinic.   [phone]  Please schedule patient for visit.  See below

## 2021-03-02 NOTE — TELEPHONE ENCOUNTER
Rx for 30 days.  Patient has not been seen over 1 year in regards to thyroid.  Previous PCP retired.  Advise to schedule a virtual visit/in person visit for future refills.

## 2021-03-04 NOTE — TELEPHONE ENCOUNTER
Left message on voice mail for patient to call clinic.   [phone]  Needs to schedule visit with new provider for further refills. Winnie Roldan no longer working for [facility].

## 2021-04-08 ENCOUNTER — TELEPHONE (OUTPATIENT)
Dept: FAMILY MEDICINE | Facility: CLINIC | Age: 66
End: 2021-04-08

## 2021-04-08 NOTE — LETTER
[facility]  [address] [address]  Phone: [phone]      April 8, 2021    Natalie Holt  [address] [address]          Dear Natalie Holt    As part of [facility]'s commitment to health and wellness, we inform our patients when records indicate the need for specific health screening.  It is time for you to schedule the following:       -- Pap smear. These are recommended every 3 years. Please call our clinic to schedule your pap smear / physical appointment.     -- Mammogram.   Please call one of the following numbers to schedule:  [facility] [phone]  [facility] [phone]  [facility] [phone]  [facility] [phone]  [facility] [phone]    -- Colon screen. Colonoscopy or FIT test (take home test). One of these tests is recommended at age 50 to screen for colon cancer.   Please call one of the following numbers to schedule a colonoscopy:  [facility] [phone]  [facility] [phone]  [facility] [phone]  [facility] [phone] (multiple sites, call for locations)  OR....  If you prefer to do a screening that is LESS INVASIVE AND LESS EXPENSIVE there is an test for you! It is called the FIT test. It is a screening test that is done yearly and can be DONE AT HOME! Do the test at home and mail it in (you don't even have to pay for postage). If you are willing to do this test, we can order the kit for you to  at our clinic. Please call us at [phone] if you need an order for a colonoscopy or FIT testing.      To schedule an appointment with a [facility] physician, or nurse practitioner, please call:   [facility] at [phone]    NOTE:  Please disregard this notice if you have had this procedure repeated or already made an appointment.        Sincerely,      [facility]  Team

## 2021-04-08 NOTE — TELEPHONE ENCOUNTER
Panel Management Review      Patient has the following on her problem list: None      Composite cancer screening  Chart review shows that this patient is due/due soon for the following Pap Smear, Mammogram and Colonoscopy  Summary:    Patient is due/failing the following:   COLONOSCOPY, MAMMOGRAM, PAP and PHYSICAL    Action needed:   Patient needs office visit for Physical.    Type of outreach:    Sent letter.    Questions for provider review:    None                                                                                                                                    Tiffany Rendon LPN        Chart routed to none .

## 2021-06-05 ENCOUNTER — TRANSFERRED RECORDS (OUTPATIENT)
Dept: HEALTH INFORMATION MANAGEMENT | Facility: CLINIC | Age: 66
End: 2021-06-05

## 2021-08-16 ENCOUNTER — NURSE TRIAGE (OUTPATIENT)
Dept: NURSING | Facility: CLINIC | Age: 66
End: 2021-08-16

## 2021-08-16 DIAGNOSIS — E03.4 HYPOTHYROIDISM DUE TO ACQUIRED ATROPHY OF THYROID: Primary | ICD-10-CM

## 2021-08-16 RX ORDER — LEVOTHYROXINE SODIUM 25 UG/1
25 TABLET ORAL DAILY
Qty: 30 TABLET | Refills: 0 | Status: SHIPPED | OUTPATIENT
Start: 2021-08-16 | End: 2021-09-20

## 2021-08-16 NOTE — TELEPHONE ENCOUNTER
Calling for a refill of levothryroxine/ has one tablet let/ sent to scheduling for an appointment for a TSH/   Beau Hatfield RN [phone]

## 2021-08-16 NOTE — TELEPHONE ENCOUNTER
I see 30 tabs levothyroxine were sent to [facility] pharmacy by [facility] triage nurse.    TSH   Date Value Ref Range Status   10/25/2019 2.67 0.40 - 4.00 mU/L Final     I see she is scheduled for visit at [facility] tomorrow for knee and thyroid check.    No action needed.    Yesica Persaud RN  [facility]

## 2021-08-17 ENCOUNTER — OFFICE VISIT (OUTPATIENT)
Dept: FAMILY MEDICINE | Facility: CLINIC | Age: 66
End: 2021-08-17
Payer: COMMERCIAL

## 2021-08-17 VITALS
DIASTOLIC BLOOD PRESSURE: 93 MMHG | BODY MASS INDEX: 43.24 KG/M2 | HEART RATE: 83 BPM | WEIGHT: 248 LBS | SYSTOLIC BLOOD PRESSURE: 145 MMHG

## 2021-08-17 DIAGNOSIS — G89.29 CHRONIC PAIN OF RIGHT KNEE: Primary | ICD-10-CM

## 2021-08-17 DIAGNOSIS — M70.51 PATELLAR BURSITIS OF RIGHT KNEE: ICD-10-CM

## 2021-08-17 DIAGNOSIS — M25.561 CHRONIC PAIN OF RIGHT KNEE: Primary | ICD-10-CM

## 2021-08-17 PROCEDURE — 99213 OFFICE O/P EST LOW 20 MIN: CPT | Performed by: NURSE PRACTITIONER

## 2021-08-17 RX ORDER — METHYLPREDNISOLONE 4 MG
TABLET, DOSE PACK ORAL
Qty: 21 TABLET | Refills: 0 | Status: SHIPPED | OUTPATIENT
Start: 2021-08-17 | End: 2022-06-09

## 2021-08-17 NOTE — PROGRESS NOTES
Assessment/Plan:   1. Chronic pain of right knee  Pain has been ongoing for over 2 years.  She is always busy and physically active working [place].  She just limps around.  Physical exam was fairly normal today.  I do not feel any instability with the ligaments.  I would guess that it is possible she has a meniscus etiology or arthritis.  She declines an x-ray today.  And I will send her to orthopedics for further evaluation and treatment.  Recommend minimizing eating refined CHO foods (startches, grains and sugars) that can be inflammatory to the joints.   - Orthopedic  Referral; Future    2. Patellar bursitis of right knee  We will do a short course of prednisone to see if we can improve inflammation and pain and her gait for her daughters wedding this next weekend.  - methylPREDNISolone (MEDROL DOSEPAK) 4 MG tablet therapy pack; Follow Package Directions  Dispense: 21 tablet; Refill: 0    Return in about 1 month (around 9/17/2021) for Follow up.    Subjective:  Natalie Holt is a 66 year old female who has had chronic right knee pain for past 2 years patient had knee injury and it just doesn't seem to have healed right. Mechanism of injury: nothing recently.  Has difficulty walking and moving around. Has an antalgic gait, occasionally has swelling in the knee. 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 No clicking or popping or knee locking. NO instability of the knee joint. She has her daughter's wedding this weekend and is concerned about limping during the event.       Patient Active Problem List   Diagnosis     Post-menopausal bleeding     Abnormal Pap smear     CARDIOVASCULAR SCREENING; LDL GOAL LESS THAN 160     24 hour contact handout given     Other symptoms involving nervous and musculoskeletal systems(781.99)     Tubular adenoma of colon     Hypothyroidism due to acquired atrophy of thyroid     Elevated glucose     Vitamin D deficiency      Advanced directives, counseling/discussion     History of colonic polyps     Morbid obesity due to excess calories (H)     Elevated liver function tests     Overflow incontinence     Current Outpatient Medications   Medication     cyclobenzaprine (FLEXERIL) 10 MG tablet     levothyroxine (SYNTHROID/LEVOTHROID) 25 MCG tablet     sulfamethoxazole-trimethoprim (BACTRIM DS) 800-160 MG tablet     No current facility-administered medications for this visit.      Allergies   Allergen Reactions     Nkda [No Known Drug Allergies]        Objective:   BP (!) 145/93 (BP Location: Right arm, Patient Position: Sitting, Cuff Size: Adult Large)   Pulse 83   Wt 112.5 kg (248 lb)   BMI 43.24 kg/m    Appearance: in no apparent distress.  Knee: Appearance: normal. Muscle bulk and tone: normal. An effusion is not appreciable. Palpation reveals tenderness: None. Range of Motion is normal. Ligamentous testing reveals stability. Valgus and Varius stress tests are neutral. Westley's test is significant for Negative. Patellar apprehension and grind testing are negative.    Latisha Treviño, APRN, CNP

## 2021-09-20 ENCOUNTER — TELEPHONE (OUTPATIENT)
Dept: FAMILY MEDICINE | Facility: CLINIC | Age: 66
End: 2021-09-20

## 2021-09-20 DIAGNOSIS — E03.4 HYPOTHYROIDISM DUE TO ACQUIRED ATROPHY OF THYROID: ICD-10-CM

## 2021-09-20 RX ORDER — LEVOTHYROXINE SODIUM 25 UG/1
25 TABLET ORAL DAILY
Qty: 30 TABLET | Refills: 0 | Status: SHIPPED | OUTPATIENT
Start: 2021-09-20 | End: 2021-11-08

## 2021-09-20 RX ORDER — LEVOTHYROXINE SODIUM 25 UG/1
25 TABLET ORAL DAILY
Qty: 30 TABLET | Refills: 0 | Status: SHIPPED | OUTPATIENT
Start: 2021-09-20 | End: 2021-09-20

## 2021-09-20 NOTE — TELEPHONE ENCOUNTER
Medication is being filled for 1 time refill only due to:  Patient needs labs thyroid and bmp.. Raudel Grissom RN

## 2021-09-20 NOTE — TELEPHONE ENCOUNTER
Reason for Call:  Medication or medication refill:    Do you use a [facility]?  Name of the pharmacy and phone number for the current request:   [facility]    Name of the medication requested: Pending Prescriptions:                       Disp   Refills    levothyroxine (SYNTHROID/LEVOTHROID) 25 M*30 tab*0            Sig: Take 1 tablet (25 mcg) by mouth daily Call           [phone] to schedule appointment      Can we leave a detailed message on this number? YES    Phone number patient can be reached at: Cell number on file:    Telephone Information:   Mobile [phone]       Best Time: any    Call taken on 9/20/2021 at 3:14 PM by Jade Crump

## 2021-11-04 ENCOUNTER — TELEPHONE (OUTPATIENT)
Dept: FAMILY MEDICINE | Facility: CLINIC | Age: 66
End: 2021-11-04

## 2021-11-04 NOTE — TELEPHONE ENCOUNTER
Not a [facility] patient, last seen in [place] and last filled by [facility].  Winnie Roldan is not longer with [facility].  Lilliana Hogue RN  [facility]

## 2021-11-04 NOTE — TELEPHONE ENCOUNTER
Chart reviewed.  Patient was seen by same day provider 8/17/21, lab orders placed 9/20/21 for TSH, BMP.  Patient was given 30 day fill at that time to allow her to schedule lab visit.    Call placed to patient.  Detailed voicemail message left advising need for lab only visit before additional refills.  Advising patient establish care with provider as she saw same day provider(cannot establish care) and her previous provider has retired.  Scheduling number given, relayed that orders are in place and she can schedule lab only appointment at any [facility] site.  Neetu Harrington RN

## 2021-11-04 NOTE — TELEPHONE ENCOUNTER
Patient requesting refill of levothyroxine (SYNTHROID/LEVOTHROID) 25 MCG tablet..  Wants a 90 day supply (not 30 day).  3 pills left.    Wants it sent to [facility] pharmacy,  [facility] [address], [place]  [phone]    Please call when done or if you have any questions.  OK to LM on VM

## 2021-11-05 ENCOUNTER — LAB (OUTPATIENT)
Dept: LAB | Facility: CLINIC | Age: 66
End: 2021-11-05
Payer: COMMERCIAL

## 2021-11-05 DIAGNOSIS — E03.4 HYPOTHYROIDISM DUE TO ACQUIRED ATROPHY OF THYROID: ICD-10-CM

## 2021-11-05 LAB — TSH SERPL DL<=0.005 MIU/L-ACNC: 2.22 MU/L (ref 0.4–4)

## 2021-11-05 PROCEDURE — 84443 ASSAY THYROID STIM HORMONE: CPT

## 2021-11-05 PROCEDURE — 36415 COLL VENOUS BLD VENIPUNCTURE: CPT

## 2021-11-08 DIAGNOSIS — E03.4 HYPOTHYROIDISM DUE TO ACQUIRED ATROPHY OF THYROID: ICD-10-CM

## 2021-11-08 RX ORDER — LEVOTHYROXINE SODIUM 25 UG/1
25 TABLET ORAL DAILY
Qty: 90 TABLET | Refills: 0 | Status: SHIPPED | OUTPATIENT
Start: 2021-11-08 | End: 2022-04-07

## 2021-11-08 NOTE — TELEPHONE ENCOUNTER
Patient requesting refill of levothyroxine to [facility].      Sallie Vigil, [facility]

## 2021-12-09 ENCOUNTER — NURSE TRIAGE (OUTPATIENT)
Dept: NURSING | Facility: CLINIC | Age: 66
End: 2021-12-09
Payer: COMMERCIAL

## 2021-12-09 DIAGNOSIS — E03.4 HYPOTHYROIDISM DUE TO ACQUIRED ATROPHY OF THYROID: ICD-10-CM

## 2021-12-09 RX ORDER — LEVOTHYROXINE SODIUM 25 UG/1
25 TABLET ORAL DAILY
Qty: 90 TABLET | Refills: 0 | OUTPATIENT
Start: 2021-12-09

## 2021-12-09 NOTE — TELEPHONE ENCOUNTER
Chart reviewed.  Levothyroxine 25 mcg ordered 11/8/21 for 90 day supply to [facility].    Call placed to patient.  Reports she only received a 30 day supply and will be out tomorrow.  Advised patient to contact [facility] and request the remainder of her refill be sent to a pharmacy near her in [place].  Patient states she will be in [place] until spring.  Explained to patient, our providers are unable to prescribe outside of the state of [place].  Neetu Harrington RN

## 2021-12-09 NOTE — TELEPHONE ENCOUNTER
Needs refill, thyroid medication, 0.25 mcg. [facility] [address] in [place], [place]. She has two pills left. She is a patient at the [facility].  Thank you,  Zoraida Johnson RN  [facility]    Reason for Disposition    Request for URGENT new prescription or refill of 'essential' medication (i.e., likelihood of harm to patient if not taken) and triager unable to fill per department policy    Additional Information    Negative: Drug overdose and triager unable to answer question    Negative: Caller requesting information unrelated to medicine    Negative: Caller requesting a prescription for Strep throat and has a positive culture result    Negative: Rash while taking a medication or within 3 days of stopping it    Negative: Immunization reaction suspected    Negative: Asthma and having symptoms of asthma (cough, wheezing, etc.)    Negative: Breastfeeding questions about mother's medicines and diet    Negative: MORE THAN A DOUBLE DOSE of a prescription or over-the-counter (OTC) drug    Negative: DOUBLE DOSE (an extra dose or lesser amount) of over-the-counter (OTC) drug and any symptoms (e.g., dizziness, nausea, pain, sleepiness)    Negative: DOUBLE DOSE (an extra dose or lesser amount) of prescription drug and any symptoms (e.g., dizziness, nausea, pain, sleepiness)    Negative: Took another person's prescription drug    Negative: DOUBLE DOSE (an extra dose or lesser amount) of prescription drug and NO symptoms (Exception: a double dose of antibiotics)    Negative: Diabetes drug error or overdose (e.g., took wrong type of insulin or took extra dose)    Negative: Caller has medication question about med not prescribed by PCP and triager unable to answer question (e.g., compatibility with other med, storage)    Protocols used: MEDICATION QUESTION CALL-A-OH

## 2022-04-06 DIAGNOSIS — E03.4 HYPOTHYROIDISM DUE TO ACQUIRED ATROPHY OF THYROID: ICD-10-CM

## 2022-04-06 NOTE — TELEPHONE ENCOUNTER
Routing refill request to provider for review/approval because:  Patient needs to be seen because:  Overdue for annual exam    Andrew Tatum RN         Xray Chest 1 View- PORTABLE-Urgent

## 2022-04-07 RX ORDER — LEVOTHYROXINE SODIUM 25 UG/1
25 TABLET ORAL DAILY
Qty: 90 TABLET | Refills: 1 | Status: SHIPPED | OUTPATIENT
Start: 2022-04-07 | End: 2022-11-07

## 2022-04-07 RX ORDER — LEVOTHYROXINE SODIUM 25 UG/1
25 TABLET ORAL DAILY
Qty: 90 TABLET | Refills: 0 | OUTPATIENT
Start: 2022-04-07

## 2022-06-09 ENCOUNTER — OFFICE VISIT (OUTPATIENT)
Dept: FAMILY MEDICINE | Facility: CLINIC | Age: 67
End: 2022-06-09
Payer: MEDICARE

## 2022-06-09 VITALS
HEART RATE: 80 BPM | OXYGEN SATURATION: 93 % | HEIGHT: 64 IN | TEMPERATURE: 97.8 F | BODY MASS INDEX: 43.54 KG/M2 | RESPIRATION RATE: 16 BRPM | WEIGHT: 255 LBS | SYSTOLIC BLOOD PRESSURE: 134 MMHG | DIASTOLIC BLOOD PRESSURE: 78 MMHG

## 2022-06-09 DIAGNOSIS — K42.9 UMBILICAL HERNIA WITHOUT OBSTRUCTION AND WITHOUT GANGRENE: Primary | ICD-10-CM

## 2022-06-09 PROCEDURE — 99213 OFFICE O/P EST LOW 20 MIN: CPT | Performed by: PHYSICIAN ASSISTANT

## 2022-06-09 ASSESSMENT — PAIN SCALES - GENERAL: PAINLEVEL: NO PAIN (0)

## 2022-06-09 NOTE — PROGRESS NOTES
"  Assessment & Plan       ICD-10-CM    1. Umbilical hernia without obstruction and without gangrene  K42.9 Adult General Surg Referral     Follow up  With General surgery for 2nd opinion.   warning signs discussed.      Return in about 2 weeks (around 6/23/2022) for recheck.    NICKIE Ling [facility]   Natalie is a 67 year old who presents for the following health issues  accompanied by her self.    History of Present Illness       Reason for visit:  Bulging hernia in belly button  Symptom onset:  1-3 days ago  Symptom intensity:  Mild  Symptom progression:  Improving  Had these symptoms before:  No  What makes it worse:  Rubbing blister  What makes it better:  Advil tablets    She eats 0-1 servings of fruits and vegetables daily.She consumes 1 sweetened beverage(s) daily.She exercises with enough effort to increase her heart rate 9 or less minutes per day.  She exercises with enough effort to increase her heart rate 3 or less days per week. She is missing 1 dose(s) of medications per week.  She is not taking prescribed medications regularly due to remembering to take.   no abdominal pains.   Bulging in umbilical for couple years. Was out in the sun and blister     Review of Systems   Constitutional, HEENT, cardiovascular, pulmonary, gi and gu systems are negative, except as otherwise noted.      Objective    /78   Pulse 80   Temp 97.8  F (36.6  C) (Tympanic)   Resp 16   Ht 1.626 m (5' 4\")   Wt 115.7 kg (255 lb)   SpO2 93%   BMI 43.77 kg/m    Body mass index is 43.77 kg/m .  Physical Exam   GENERAL: healthy, alert and no distress  RESP: lungs clear to auscultation - no rales, rhonchi or wheezes  CV: regular rate and rhythm, normal S1 S2, no S3 or S4, no murmur, click or rub, no peripheral edema and peripheral pulses strong  ABDOMEN: soft, nontender, no hepatosplenomegaly, no masses and bowel sounds normal- non-tender umbilical hernia with denuded blister. " No signs of infection.   MS: no gross musculoskeletal defects noted, no edema

## 2022-06-28 ENCOUNTER — OFFICE VISIT (OUTPATIENT)
Dept: SURGERY | Facility: CLINIC | Age: 67
End: 2022-06-28
Payer: MEDICARE

## 2022-06-28 VITALS
DIASTOLIC BLOOD PRESSURE: 86 MMHG | WEIGHT: 255 LBS | BODY MASS INDEX: 43.77 KG/M2 | SYSTOLIC BLOOD PRESSURE: 132 MMHG | HEART RATE: 98 BPM

## 2022-06-28 DIAGNOSIS — K42.9 UMBILICAL HERNIA WITHOUT OBSTRUCTION AND WITHOUT GANGRENE: ICD-10-CM

## 2022-06-28 PROCEDURE — 99203 OFFICE O/P NEW LOW 30 MIN: CPT | Performed by: SURGERY

## 2022-06-28 RX ORDER — SULFAMETHOXAZOLE/TRIMETHOPRIM 800-160 MG
1 TABLET ORAL 2 TIMES DAILY
Qty: 14 TABLET | Refills: 0 | Status: SHIPPED | OUTPATIENT
Start: 2022-06-28 | End: 2023-06-05

## 2022-06-28 NOTE — PROGRESS NOTES
Assessment: Primary, reducible umbilical hernia.    Plan:    We have discussed observation, reduction techniques and importance, incarceration and strangulation signs, symptoms and importance as well as need to seek emergency treatment.      We have discussed open umbilical hernia repair with mesh in detail, including benefits, alternatives, complications, incision, scar, mesh, infection, anesthesia, bleeding, blood transfusion, DVT, PE, hernia recurrence, lifting and activity limits after surgery.  All questions have been answered to the best of my ability.    She has been given literature to review.   We will schedule surgery if the patient wishes, after the ulcer has healed    HPI:  Natalie is a 67 year old female who presents for evaluation of umbilical hernia and pain. She was out in the sun and the belly button got a sunburn. This led to an ulcer on the umbilicus. Ulcer is smaller but more red. She has had the hernia for a few years and it has not caused her pain in the past.     Past Medical History:   has a past medical history of Anxiety, CAD (coronary artery disease) (4/29/2011), Generalised anxiety disorder (1/12/2010), Hypothyroidism, and Post-menopausal bleeding (1/22/2010).    Past Surgical History:  Past Surgical History:   Procedure Laterality Date     C SOLID INSERT,FOAM,HOOKON HARDWARE      Removal of 3 pins from her Left ankle     ZZC ANESTH,ANKLE REPLACEMENT      Left ankle, 3 pin inserted.     ZZC ANESTH,ELBOW AREA SURGERY      Nasim inserted into her left elbow.     ZZC FACIAL REPLACEMENT      Repair to the right side of her temple.        Review of Systems  10 point ROS neg other than the symptoms noted above in the HPI.    PE:    /86   Pulse 98   Wt 115.7 kg (255 lb)   BMI 43.77 kg/m    General - Well developed, well nourished female in no apparent distress  HEENT:  Head normocephalic and atraumatic, pupils equal and round, conjunctivae clear, no scleral icterus, mucous membranes moist,  external ears and nose normal  Lymphatic: No cervical, or supraclavicular lymphadenopathy  Lungs: Clear to auscultation bilaterally  Heart: Regular rate and rhythm, no murmurs  Abdomen:  abdomen is soft without significant tenderness, masses, organomegaly or guarding  Hernia:  umbilical, 2 cm, reducible, mildly tender, ulcer with some erythema  Extremities: Warm without edema  Musculoskeletal:  Normal station and gait  Neurologic: Nonfocal  Psychiatric: Mood and affect appropriate  Skin: Without lesions or rashes, or juandice    TSH, CBC, CMP reviewed    Quincy Zhang DO

## 2022-06-28 NOTE — LETTER
6/28/2022         RE: Natalie Holt  [address] [address]        Dear Colleague,    Thank you for referring your patient, Natalie Holt, to the [facility]. Please see a copy of my visit note below.    Assessment: Primary, reducible umbilical hernia.    Plan:    We have discussed observation, reduction techniques and importance, incarceration and strangulation signs, symptoms and importance as well as need to seek emergency treatment.      We have discussed open umbilical hernia repair with mesh in detail, including benefits, alternatives, complications, incision, scar, mesh, infection, anesthesia, bleeding, blood transfusion, DVT, PE, hernia recurrence, lifting and activity limits after surgery.  All questions have been answered to the best of my ability.    She has been given literature to review.   We will schedule surgery if the patient wishes, after the ulcer has healed    HPI:  Natalie is a 67 year old female who presents for evaluation of umbilical hernia and pain. She was out in the sun and the belly button got a sunburn. This led to an ulcer on the umbilicus. Ulcer is smaller but more red. She has had the hernia for a few years and it has not caused her pain in the past.     Past Medical History:   has a past medical history of Anxiety, CAD (coronary artery disease) (4/29/2011), Generalised anxiety disorder (1/12/2010), Hypothyroidism, and Post-menopausal bleeding (1/22/2010).    Past Surgical History:  Past Surgical History:   Procedure Laterality Date     C SOLID INSERT,FOAM,HOOKON HARDWARE      Removal of 3 pins from her Left ankle     ZZC ANESTH,ANKLE REPLACEMENT      Left ankle, 3 pin inserted.     ZZC ANESTH,ELBOW AREA SURGERY      Nasim inserted into her left elbow.     ZZC FACIAL REPLACEMENT      Repair to the right side of her temple.        Review of Systems  10 point ROS neg other than the symptoms noted above in the HPI.    PE:    /86    Pulse 98   Wt 115.7 kg (255 lb)   BMI 43.77 kg/m    General - Well developed, well nourished female in no apparent distress  HEENT:  Head normocephalic and atraumatic, pupils equal and round, conjunctivae clear, no scleral icterus, mucous membranes moist, external ears and nose normal  Lymphatic: No cervical, or supraclavicular lymphadenopathy  Lungs: Clear to auscultation bilaterally  Heart: Regular rate and rhythm, no murmurs  Abdomen:  abdomen is soft without significant tenderness, masses, organomegaly or guarding  Hernia:  umbilical, 2 cm, reducible, mildly tender, ulcer with some erythema  Extremities: Warm without edema  Musculoskeletal:  Normal station and gait  Neurologic: Nonfocal  Psychiatric: Mood and affect appropriate  Skin: Without lesions or rashes, or juandice    TSH, CBC, CMP reviewed    Quincy Zhang DO              Again, thank you for allowing me to participate in the care of your patient.        Sincerely,        Quincy Zhang DO

## 2022-10-28 ENCOUNTER — HOSPITAL ENCOUNTER (EMERGENCY)
Facility: CLINIC | Age: 67
Discharge: HOME | End: 2022-10-28
Attending: PHYSICIAN ASSISTANT | Admitting: PHYSICIAN ASSISTANT
Payer: MEDICARE

## 2022-10-28 VITALS
DIASTOLIC BLOOD PRESSURE: 97 MMHG | BODY MASS INDEX: 42.68 KG/M2 | HEIGHT: 64 IN | SYSTOLIC BLOOD PRESSURE: 157 MMHG | WEIGHT: 250 LBS | TEMPERATURE: 97.8 F | OXYGEN SATURATION: 99 % | HEART RATE: 79 BPM | RESPIRATION RATE: 18 BRPM

## 2022-10-28 DIAGNOSIS — S06.0X0A CONCUSSION WITHOUT LOSS OF CONSCIOUSNESS, INITIAL ENCOUNTER: ICD-10-CM

## 2022-10-28 PROCEDURE — G0463 HOSPITAL OUTPT CLINIC VISIT: HCPCS | Performed by: PHYSICIAN ASSISTANT

## 2022-10-28 PROCEDURE — 99213 OFFICE O/P EST LOW 20 MIN: CPT | Performed by: PHYSICIAN ASSISTANT

## 2022-10-28 NOTE — ED TRIAGE NOTES
Pt fell yesterday pulling branches that were cut off a tree, pt fell backwards hit her head on the ground. Pt woke up nauseated today. Denies pain.     Triage Assessment     Row Name 10/28/22 1212       Triage Assessment (Adult)    Airway WDL WDL       Respiratory WDL    Respiratory WDL WDL       Cardiac WDL    Cardiac WDL WDL       Cognitive/Neuro/Behavioral WDL    Cognitive/Neuro/Behavioral WDL WDL

## 2022-10-28 NOTE — ED PROVIDER NOTES
"  History     Chief Complaint   Patient presents with     Head Injury     HPI  Natalie Holt is a 67 year old female who presents to urgent care accompanied by her  with concern over evaluation following a head injury which occurred earlier in the day yesterday.  Patient reports that she was attempting to make some crafts collecting sticks from outside where she grabbed a branch which broke resulting in her falling posteriorly and hitting the back of her head on grass.  She did not have any loss of consciousness.  She did report that she felt dazed for several minutes.  No memory changes of the events.  Since injury she has complained of headache, dizziness, lightheadedness, nausea, photophobia and instability on her feet stating she \"feels like she is hung over\"  She is unaware of any ecchymosis lacerations or abrasions from her fall.  She denies any significant neck pain.  No fever, chills, myalgias, cough, chest pain, dyspnea, wheezing, vomiting, diarrhea.  No changes in speech.  Numbness or weakness in her extremities.  She has attempted to treat with some tylenol without relief.  She is not on any blood thinners.  No prior history of concussion.      Allergies:  Allergies   Allergen Reactions     Nkda [No Known Drug Allergies]        Problem List:    Patient Active Problem List    Diagnosis Date Noted     Overflow incontinence 03/30/2018     Priority: Medium     Elevated liver function tests 02/19/2018     Priority: Medium     Advanced directives, counseling/discussion 09/22/2017     Priority: Medium     Advance Care Planning 9/22/2017: ACP Review of Chart / Resources Provided:  Reviewed chart for advance care plan.  Natalie Holt has been provided information and resources to begin or update their advance care plan.  Added by Jackelyn Shine             History of colonic polyps 09/22/2017     Priority: Medium     Morbid obesity due to excess calories (H) 09/22/2017     Priority: Medium     " Hypothyroidism due to acquired atrophy of thyroid 04/25/2017     Priority: Medium     Elevated glucose 04/25/2017     Priority: Medium     Vitamin D deficiency 04/25/2017     Priority: Medium     Tubular adenoma of colon 08/21/2012     Priority: Medium     Tubular adenoma on colonoscopy on 8-2012. Recommend next colonoscopy in 8-2017        Other symptoms involving nervous and musculoskeletal systems(781.99) 08/10/2012     Priority: Medium     CARDIOVASCULAR SCREENING; LDL GOAL LESS THAN 160 05/09/2010     Priority: Medium     Abnormal Pap smear 03/02/2010     Priority: Medium     Endometrial cells on most recent Pap smear. Endometrial biopsy recommended.       Post-menopausal bleeding 01/22/2010     Priority: Medium     24 hour contact handout given 07/27/2012     Priority: Low     EMERGENCY CARE PLAN  Presenting Problem Signs and Symptoms Treatment Plan    Questions or conerns during clinic hours    I will call the clinic directly     Questions or conerns outside clinic hours    I will call the 24 hour nurse line at [phone]    Patient needs to schedule an appointment    I will call the 24 hour scheduling team at [phone] or clinic directly    Same day treatment     I will call the clinic first, nurse line if after hours, urgent care and express care if needed                                    Past Medical History:    Past Medical History:   Diagnosis Date     Anxiety      CAD (coronary artery disease) 4/29/2011     Generalised anxiety disorder 1/12/2010     Hypothyroidism      Post-menopausal bleeding 1/22/2010       Past Surgical History:    Past Surgical History:   Procedure Laterality Date     C SOLID INSERT,FOAM,HOOKON HARDWARE      Removal of 3 pins from her Left ankle     ZZC ANESTH,ANKLE REPLACEMENT      Left ankle, 3 pin inserted.     ZZC ANESTH,ELBOW AREA SURGERY      Nasim inserted into her left elbow.     ZZC FACIAL REPLACEMENT      Repair to the right side of her temple.       Family History:   "  Family History   Problem Relation Age of Onset     Gynecology Daughter         Cyst on her ovary       Social History:  Marital Status:   [2]  Social History     Tobacco Use     Smoking status: Never     Smokeless tobacco: Never     Tobacco comments:     Social smoker on rare occsions.   Vaping Use     Vaping Use: Never used   Substance Use Topics     Alcohol use: Yes     Comment: Occasional beer per week.     Drug use: No        Medications:    levothyroxine (SYNTHROID/LEVOTHROID) 25 MCG tablet  sulfamethoxazole-trimethoprim (BACTRIM DS) 800-160 MG tablet      Review of Systems   Constitutional: Negative for chills and fever.   HENT: Negative for congestion, ear pain and sore throat.    Eyes: Positive for photophobia. Negative for pain, discharge, redness, itching and visual disturbance.   Respiratory: Negative for shortness of breath, wheezing and stridor.    Cardiovascular: Negative for chest pain, palpitations and leg swelling.   Gastrointestinal: Negative for abdominal pain, diarrhea, nausea and vomiting.   Skin: Negative for color change and wound.   Neurological: Positive for dizziness, light-headedness and headaches. Negative for seizures, syncope, speech difficulty and weakness.     Physical Exam   BP: (!) 157/97  Pulse: 79  Temp: 97.8  F (36.6  C)  Resp: 18  Height: 162.6 cm (5' 4\")  Weight: 113.4 kg (250 lb)  SpO2: 99 %  Physical Exam  GENERAL APPEARANCE: healthy, alert and no distress  EYES: EOMI,  PERRL, conjunctiva clear  HENT: Normocephalic, atraumatic,  ear canals and TM's normal.  Nose and mouth without ulcers, erythema or lesions uvula and soft palate rise symmetrically with phonation, tongue protrudes to midline, equal motor and sensory function of the face bilaterally  NECK: supple, nontender, no lymphadenopathy, 5/5 strength against resistance with rotation of the right, left arm shoulder shrug  RESP: lungs clear to auscultation - no rales, rhonchi or wheezes  CV: regular rates and " rhythm, normal S1 S2, no murmur noted  NEURO: Normal strength and tone, sensory exam grossly normal,  normal speech and mentation, reflexes of upper and lower extremities equal bilaterally, cranial nerves III through XII grossly intact.  Normal finger-nose-finger testing.  No dysdiadochokinesis  SKIN: no suspicious lesions or rashes  ED Course           Procedures       Critical Care time:  none        No results found for this or any previous visit (from the past 24 hour(s)).    Medications - No data to display    Assessments & Plan (with Medical Decision Making)     I have reviewed the nursing notes.  I have reviewed the findings, diagnosis, plan and need for follow up with the patient.     New Prescriptions    No medications on file     Final diagnoses:   Concussion without loss of consciousness, initial encounter     67-year-old female presents to urgent care with concern requesting evaluation for head injury which occurred yesterday when she fell from a standing position and landed on the grass hitting the back of her head.  She had elevated blood pressure upon arrival, remainder of vital signs stable.  Focal neurologic exam was within normal limits.  I discussed with patient that headache, dizziness, lightheadedness, nausea following head injury are consistent with concussion.  Based on normal neurologic examination, duration of time since her injury I do not suspect skull fracture, intracranial bleed/swelling and patient agreed to defer imaging at this time.  She was discharged home stable with instructions rest, tylenol as needed for headache,  follow up with PCP if no improvement in 5-7 days.  Worrisome reasons to return to ER/UC sooner dicussed.     Disclaimer: This note consists of symbols derived from keyboarding, dictation, and/or voice recognition software. As a result, there may be errors in the script that have gone undetected.  Please consider this when interpreting information found in the  chart.    10/28/2022   [facility] EMERGENCY DEPT     Tiffany Browne PA-C  10/29/22 1936

## 2022-10-29 ASSESSMENT — ENCOUNTER SYMPTOMS
HEADACHES: 1
SPEECH DIFFICULTY: 0
SHORTNESS OF BREATH: 0
LIGHT-HEADEDNESS: 1
CHILLS: 0
NAUSEA: 0
EYE PAIN: 0
DIZZINESS: 1
PALPITATIONS: 0
FEVER: 0
EYE DISCHARGE: 0
WOUND: 0
WEAKNESS: 0
VOMITING: 0
WHEEZING: 0
EYE REDNESS: 0
DIARRHEA: 0
STRIDOR: 0
EYE ITCHING: 0
COLOR CHANGE: 0
SEIZURES: 0
SORE THROAT: 0
PHOTOPHOBIA: 1
ABDOMINAL PAIN: 0

## 2022-11-04 DIAGNOSIS — E03.4 HYPOTHYROIDISM DUE TO ACQUIRED ATROPHY OF THYROID: ICD-10-CM

## 2022-11-07 RX ORDER — LEVOTHYROXINE SODIUM 25 UG/1
25 TABLET ORAL DAILY
Qty: 90 TABLET | Refills: 0 | Status: SHIPPED | OUTPATIENT
Start: 2022-11-07 | End: 2023-06-05

## 2023-05-03 ENCOUNTER — TELEPHONE (OUTPATIENT)
Dept: FAMILY MEDICINE | Facility: CLINIC | Age: 68
End: 2023-05-03
Payer: MEDICARE

## 2023-05-03 NOTE — TELEPHONE ENCOUNTER
Patient Quality Outreach    Patient is due for the following:   Colon Cancer Screening  Breast Cancer Screening - Mammogram  Physical Annual Wellness Visit    Next Steps:   Schedule a Annual Wellness Visit    Type of outreach:    Sent [employer] message.    Next Steps:  Reach out within 90 days via [employer].    Max number of attempts reached: No. Will try again in 90 days if patient still on fail list.    Questions for provider review:    None     Ayana Hall MA

## 2023-06-05 ENCOUNTER — OFFICE VISIT (OUTPATIENT)
Dept: FAMILY MEDICINE | Facility: CLINIC | Age: 68
End: 2023-06-05
Payer: MEDICARE

## 2023-06-05 VITALS
HEIGHT: 64 IN | WEIGHT: 265 LBS | OXYGEN SATURATION: 98 % | SYSTOLIC BLOOD PRESSURE: 130 MMHG | HEART RATE: 92 BPM | TEMPERATURE: 98.6 F | DIASTOLIC BLOOD PRESSURE: 82 MMHG | BODY MASS INDEX: 45.24 KG/M2 | RESPIRATION RATE: 18 BRPM

## 2023-06-05 DIAGNOSIS — Z78.0 POSTMENOPAUSAL: ICD-10-CM

## 2023-06-05 DIAGNOSIS — R73.03 PREDIABETES: ICD-10-CM

## 2023-06-05 DIAGNOSIS — Z13.220 SCREENING FOR HYPERLIPIDEMIA: ICD-10-CM

## 2023-06-05 DIAGNOSIS — Z12.31 VISIT FOR SCREENING MAMMOGRAM: ICD-10-CM

## 2023-06-05 DIAGNOSIS — Z12.11 SCREEN FOR COLON CANCER: ICD-10-CM

## 2023-06-05 DIAGNOSIS — E03.4 HYPOTHYROIDISM DUE TO ACQUIRED ATROPHY OF THYROID: Primary | ICD-10-CM

## 2023-06-05 LAB
CHOLEST SERPL-MCNC: 169 MG/DL
ERYTHROCYTE [DISTWIDTH] IN BLOOD BY AUTOMATED COUNT: 12.2 % (ref 10–15)
HBA1C MFR BLD: 5.7 % (ref 0–5.6)
HCT VFR BLD AUTO: 41.8 % (ref 35–47)
HDLC SERPL-MCNC: 60 MG/DL
HGB BLD-MCNC: 14 G/DL (ref 11.7–15.7)
LDLC SERPL CALC-MCNC: 82 MG/DL
MCH RBC QN AUTO: 30 PG (ref 26.5–33)
MCHC RBC AUTO-ENTMCNC: 33.5 G/DL (ref 31.5–36.5)
MCV RBC AUTO: 90 FL (ref 78–100)
NONHDLC SERPL-MCNC: 109 MG/DL
PLATELET # BLD AUTO: 224 10E3/UL (ref 150–450)
RBC # BLD AUTO: 4.67 10E6/UL (ref 3.8–5.2)
TRIGL SERPL-MCNC: 134 MG/DL
TSH SERPL DL<=0.005 MIU/L-ACNC: 3.16 UIU/ML (ref 0.3–4.2)
WBC # BLD AUTO: 7.8 10E3/UL (ref 4–11)

## 2023-06-05 PROCEDURE — 36415 COLL VENOUS BLD VENIPUNCTURE: CPT | Performed by: FAMILY MEDICINE

## 2023-06-05 PROCEDURE — 99214 OFFICE O/P EST MOD 30 MIN: CPT | Performed by: FAMILY MEDICINE

## 2023-06-05 PROCEDURE — 80061 LIPID PANEL: CPT | Performed by: FAMILY MEDICINE

## 2023-06-05 PROCEDURE — 83036 HEMOGLOBIN GLYCOSYLATED A1C: CPT | Performed by: FAMILY MEDICINE

## 2023-06-05 PROCEDURE — 84443 ASSAY THYROID STIM HORMONE: CPT | Performed by: FAMILY MEDICINE

## 2023-06-05 PROCEDURE — 85027 COMPLETE CBC AUTOMATED: CPT | Performed by: FAMILY MEDICINE

## 2023-06-05 RX ORDER — LEVOTHYROXINE SODIUM 25 UG/1
25 TABLET ORAL DAILY
Qty: 90 TABLET | Refills: 3 | Status: SHIPPED | OUTPATIENT
Start: 2023-06-05 | End: 2024-09-16

## 2023-06-05 ASSESSMENT — PAIN SCALES - GENERAL: PAINLEVEL: NO PAIN (0)

## 2023-06-05 NOTE — PROGRESS NOTES
"  Assessment & Plan     Hypothyroidism due to acquired atrophy of thyroid  TSH ordered, will titrate levothyroxine dose accordingly  - TSH with free T4 reflex; Future  - levothyroxine (SYNTHROID/LEVOTHROID) 25 MCG tablet; Take 1 tablet (25 mcg) by mouth daily  - CBC with platelets; Future    Screen for colon cancer  Screening colonoscopy ordered  - Colonoscopy Screening  Referral; Future    Visit for screening mammogram  Screening mammogram ordered  - MA SCREENING DIGITAL BILAT - Future  (s+30); Future    Screening for hyperlipidemia  Lipid panel ordered for hyperlipidemia screening  - Lipid panel reflex to direct LDL Non-fasting; Future    Postmenopausal  Recommended regular walks, healthy diet, weight loss and DEXA scan ordered  - DEXA HIP/PELVIS/SPINE - Future; Future    Prediabetes  History of borderline diabetes.  - Hemoglobin A1c; Future         BMI:   Estimated body mass index is 45.49 kg/m  as calculated from the following:    Height as of this encounter: 1.626 m (5' 4\").    Weight as of this encounter: 120.2 kg (265 lb).   Weight management plan: Discussed healthy diet and exercise guidelines        Hardy Dubon MD  [facility]    Cornelia Estrada is a 68 year old, presenting for the following health issues:  Thyroid Problem      History of Present Illness       Hypothyroidism:     Since last visit, patient describes the following symptoms::  None    Weight gain::  No weight gain    Weight loss::  No weight loss    She eats 2-3 servings of fruits and vegetables daily.She consumes 2 sweetened beverage(s) daily.She exercises with enough effort to increase her heart rate 20 to 29 minutes per day.  She exercises with enough effort to increase her heart rate 3 or less days per week. She is missing 1 dose(s) of medications per week.  She is not taking prescribed medications regularly due to remembering to take.       Review of Systems   Constitutional, HEENT, " "cardiovascular, pulmonary, GI, , musculoskeletal, neuro, skin, endocrine and psych systems are negative, except as otherwise noted.        Objective    /82 (Cuff Size: Adult Large)   Pulse 92   Temp 98.6  F (37  C) (Tympanic)   Resp 18   Ht 1.626 m (5' 4\")   Wt 120.2 kg (265 lb)   SpO2 98%   BMI 45.49 kg/m    Body mass index is 45.49 kg/m .  Physical Exam   GENERAL: alert and no distress  EYES: Eyes grossly normal to inspection, PERRL and conjunctivae and sclerae normal  HENT: normal cephalic/atraumatic, nose and mouth without ulcers or lesions, oropharynx clear and oral mucous membranes moist  NECK: no adenopathy, no asymmetry, masses, or scars and thyroid normal to palpation  RESP: lungs clear to auscultation - no rales, rhonchi or wheezes  CV: regular rate and rhythm, normal S1 S2, no S3 or S4, no murmur, click or rub, no peripheral edema and peripheral pulses strong  ABDOMEN: soft, nontender, no hepatosplenomegaly, no masses  MS: no gross musculoskeletal defects noted, no edema  NEURO: Normal strength and tone, mentation intact and speech normal  PSYCH: mentation appears normal, affect normal/bright            "

## 2023-06-05 NOTE — NURSING NOTE
"Chief Complaint   Patient presents with     Thyroid Problem     /82 (Cuff Size: Adult Large)   Pulse 92   Temp 98.6  F (37  C) (Tympanic)   Resp 18   Ht 1.626 m (5' 4\")   Wt 120.2 kg (265 lb)   SpO2 98%   BMI 45.49 kg/m   Estimated body mass index is 45.49 kg/m  as calculated from the following:    Height as of this encounter: 1.626 m (5' 4\").    Weight as of this encounter: 120.2 kg (265 lb).  Patient presents to the clinic using No DME      Health Maintenance that is potentially due pending provider review:    Health Maintenance Due   Topic Date Due     DEXA  Never done     ANNUAL REVIEW OF HM ORDERS  Never done     COVID-19 Vaccine (1) Never done     ZOSTER IMMUNIZATION (1 of 2) Never done     COLORECTAL CANCER SCREENING  08/17/2017     MAMMO SCREENING  04/25/2018     MEDICARE ANNUAL WELLNESS VISIT  04/01/2022     Pneumococcal Vaccine: 65+ Years (1 - PCV) Never done     LIPID  06/28/2022     ADVANCE CARE PLANNING  09/22/2022     TSH W/FREE T4 REFLEX  11/05/2022     FALL RISK ASSESSMENT  06/09/2023                "

## 2023-06-07 ENCOUNTER — HOSPITAL ENCOUNTER (OUTPATIENT)
Facility: CLINIC | Age: 68
End: 2023-06-07
Attending: SURGERY | Admitting: SURGERY
Payer: MEDICARE

## 2023-06-07 ENCOUNTER — TELEPHONE (OUTPATIENT)
Dept: SURGERY | Facility: CLINIC | Age: 68
End: 2023-06-07
Payer: MEDICARE

## 2023-06-07 NOTE — TELEPHONE ENCOUNTER
Screening Questions  BLUE  KIND OF PREP RED  LOCATION [review exclusion criteria] GREEN  SEDATION TYPE        n Are you active on mychart?       Ling Ordering/Referring Provider?        BCBS What type of coverage do you have?      n Have you had a positive covid test in the last 14 days?     45.49 1. BMI  [BMI 40+ - review exclusion criteria& smart-phrase document]    y  2. Are you able to give consent for your medical care? [IF NO,RN REVIEW]          n  3. Are you taking any prescription pain medications on a routine schedule   (ex narcotics: oxycodone, roxicodone, oxycontin,  and percocet)? [RN Review]        n  3a. EXTENDED PREP What kind of prescription?     n 4. Do you have any chemical dependencies such as alcohol, street drugs, or methadone?        **If yes 3- 5 , please schedule with MAC sedation.**          IF YES TO ANY 6 - 10 - HOSPITAL SETTING ONLY.     n 6.   Do you need assistance transferring?     n 7.   Have you had a heart or lung transplant?    n 8.   Are you currently on dialysis?   n 9.   Do you use daily home oxygen?   n 10. Do you take nitroglycerin?   10a. n If yes, how often?     n 11. Are you currently pregnant?    11a. n If yes, how many weeks? [ Greater than 12 weeks, OR NEEDED]    n 12. Do you have Pulmonary Hypertension? *NEED PAC APPT AT [facility] w/ [facility]*     n 13. [review exclusion criteria]  Do you have any implantable devices in your body (pacemaker, defib, LVAD)?    n 14. In the past 6 months, have you had any heart related issues including cardiomyopathy or heart attack?     14a. n If yes, did it require cardiac stenting if so when?     n 15. Have you had a stroke or Transient ischemic attack (TIA - aka  mini stroke ) within 6 months?      n 16. Do you have mod to severe Obstructive Sleep Apnea?  [Hospital only]    n 17. Do you have SEVERE AND UNCONTROLLED asthma? *NEED PAC APPT AT [facility] w/[facility]*     18.Do you take blood thinners?  No    n 19. Do you take any of the following  "medications?    nPhentermine    nOzempic    nWegovy (Semaglutide)      19a. If yes, \"Hold for 7 days before procedure.  Please consult your prescribing provider if you have questions about holding this medication.\"     n  20. Do you have chronic kidney disease?      n  21. Do you have a diagnosis of diabetes?     n  22. On a regular basis do you go 3-5 days between bowel movements?     y 23. Preferred LOCAL Pharmacy for Pre Prescription           [facility] - [place], [place] - [address]         - CLOSING REMINDERS -    You will receive a call from a Nurse to review instructions and health history.  This assessment must be completed prior to your procedure.  Failure to complete the Nurse assessment may result in the procedure being cancelled.      On the day of your procedure, please designatean adult(s) who can drive you home stay with you for the next 24 hours. The medicines used in the exam will make you sleepy. You will not be able to drive.      You cannot take public transportation, ride share services, or non-medical taxi service without a responsible caregiver.  Medical transport services are allowed with the requirement that a responsible caregiver will receive you at your destination.  We require that drivers and caregivers are confirmed prior to your procedure.      - SCHEDULING DETAILS -  n & n Hospital Setting Required & If yes, what is the exclusion?   Walker  Surgeon    9/7/23  Date of Procedure  Lower Endoscopy [Colonoscopy]  Type of Procedure Scheduled  [facility] - If you answer yes to questions #1, #3, #22 (Chandu Shelby and CF pts)Which Colonoscopy Prep was Sent?     general Sedation Type     n PAC / Pre-op Required       "

## 2023-06-13 ENCOUNTER — TELEPHONE (OUTPATIENT)
Dept: FAMILY MEDICINE | Facility: CLINIC | Age: 68
End: 2023-06-13
Payer: MEDICARE

## 2023-06-13 NOTE — LETTER
June 13, 2023    To  Natalie Holt  [address] [address]    Your team at [facility] cares about your health. We have reviewed your chart and based on our findings; we are making the following recommendations to better manage your health.     You are in particular need of attention regarding the following:     Schedule Annual MAMMOGRAPHY. [facility] scheduling number is [phone] or schedule in [employer] (self referral).  Call or MyChart message your clinic to schedule a colonoscopy, schedule/ a FIT Test, or order a Cologuard test. If you are unsure what type of test you need, please call your clinic and speak to clinic staff.   Colon cancer is now the second leading cause of cancer-related deaths in the [place] for both men and women and there are over 130,000 new cases and 50,000 deaths per year from colon cancer. Colonoscopies can prevent 90-95% of these deaths. Problem lesions can be removed before they ever become cancer. This test is not only looking for cancer, but also getting rid of precancerous lesions.   If you are under/uninsured, we recommend you contact the [employer] Program.[employer] is a free colorectal cancer screening program that provides colonoscopies for eligible under/uninsured [place] men and women. If you are interested in receiving a free colonoscopy, please call [employer] at t [phone] (mention code [employer]) to see if you're eligible. Please have them send us the results.   PREVENTATIVE VISIT: Annual Medicare Wellness:Schedule an Annual Medicare Wellness Exam. Please call your [facility] clinic to set up your appointment.    If you have already completed these items, please contact the clinic via phone or   [employer] so your care team can review and update your records. Thank you for   choosing [facility] for your healthcare needs. For any questions,   concerns, or to schedule an appointment please contact  our clinic.    Healthy Regards,      Your  Health [place] Care Team

## 2023-06-13 NOTE — TELEPHONE ENCOUNTER
Patient Quality Outreach    Patient is due for the following:   Colon Cancer Screening  Breast Cancer Screening - Mammogram  Physical Annual Wellness Visit    Next Steps:   Schedule a office visit for Wellness exam    Type of outreach:    Sent letter.    Next Steps:  Reach out within 90 days via Letter.    Max number of attempts reached: No. Will try again in 90 days if patient still on fail list.    Questions for provider review:    None           Ayana Hall MA

## 2023-06-27 ENCOUNTER — HOSPITAL ENCOUNTER (OUTPATIENT)
Dept: BONE DENSITY | Facility: CLINIC | Age: 68
Discharge: HOME | End: 2023-06-27
Attending: FAMILY MEDICINE
Payer: MEDICARE

## 2023-06-27 ENCOUNTER — HOSPITAL ENCOUNTER (OUTPATIENT)
Dept: MAMMOGRAPHY | Facility: CLINIC | Age: 68
Discharge: HOME | End: 2023-06-27
Attending: FAMILY MEDICINE
Payer: MEDICARE

## 2023-06-27 DIAGNOSIS — Z78.0 POSTMENOPAUSAL: ICD-10-CM

## 2023-06-27 DIAGNOSIS — Z12.31 VISIT FOR SCREENING MAMMOGRAM: ICD-10-CM

## 2023-06-27 PROCEDURE — 77080 DXA BONE DENSITY AXIAL: CPT

## 2023-06-27 PROCEDURE — 77063 BREAST TOMOSYNTHESIS BI: CPT

## 2023-07-17 ENCOUNTER — OFFICE VISIT (OUTPATIENT)
Dept: FAMILY MEDICINE | Facility: CLINIC | Age: 68
End: 2023-07-17
Payer: MEDICARE

## 2023-07-17 VITALS
HEIGHT: 64 IN | SYSTOLIC BLOOD PRESSURE: 146 MMHG | BODY MASS INDEX: 44.56 KG/M2 | WEIGHT: 261 LBS | DIASTOLIC BLOOD PRESSURE: 82 MMHG | RESPIRATION RATE: 18 BRPM | TEMPERATURE: 97.8 F | OXYGEN SATURATION: 97 % | HEART RATE: 82 BPM

## 2023-07-17 DIAGNOSIS — M85.80 OSTEOPENIA, UNSPECIFIED LOCATION: Primary | ICD-10-CM

## 2023-07-17 PROCEDURE — 99213 OFFICE O/P EST LOW 20 MIN: CPT | Performed by: FAMILY MEDICINE

## 2023-07-17 ASSESSMENT — PAIN SCALES - GENERAL: PAINLEVEL: NO PAIN (0)

## 2023-07-17 NOTE — PROGRESS NOTES
Assessment & Plan     Osteopenia, unspecified location  Recent DEXA scan findings reviewed in detail, consistent with osteopenia. FRAX score indicates risk for major osteoporotic fracture and 5.8%, hip fracture of 0.5%.  Recommended regular exercise, healthy diet, vitamin D 800 international unit(s), calcium 1200 mg daily.  Written information provided.  All questions answered.      Hardy Dubon MD  [facility]      Cornelia Estrada is a 68 year old, presenting for the following health issues:  Results      History of Present Illness       Vascular Disease:  She presents for follow up of vascular disease.  She never takes nitroglycerin. She is not taking daily aspirin.    Reason for visit:  Bone density test    She eats 2-3 servings of fruits and vegetables daily.She consumes 2 sweetened beverage(s) daily.She exercises with enough effort to increase her heart rate 30 to 60 minutes per day.  She exercises with enough effort to increase her heart rate 3 or less days per week. She is missing 1 dose(s) of medications per week.     DXA RESULTS  -Lumbar Spine: L1-L4: BMD: 0.959 g/cm2. T-score: -1.9. Z-score: -1.5.  -RIGHT Hip Total: BMD: 0.957 g/cm2. T-score: -0.4. Z-score: 0.0.  -RIGHT Hip Femoral neck: BMD: 0.949 g/cm2. T-score: -0.6. Z-score: 0.1.  -LEFT Hip Total: BMD: 0.974 g/cm2. T-score: -0.3. Z-score: 0.1.  -LEFT Hip Femoral neck: BMD: 0.878 g/cm2. T-score: -1.1. Z-score: -0.4.     WHO T-SCORE CRITERIA  -Normal: T score at or above -1 SD  -Osteopenia: T score between -1 and -2.5 SD  -Osteoporosis: T score at or below -2.5 SD     FRACTURE RISK  -FRAX Results: The 10 year probability of major osteoporotic fracture is 21.6%, and of hip fracture is 1.1%, based on left femoral neck BMD.     RECOMMENDATIONS  Consider treatment if major osteoporotic fracture score is greater than or equal to 20%, and if the hip fracture score is greater than or equal to 3%.                                  "                                   IMPRESSION: Low bone density (OSTEOPENIA). T score meets the WHO criteria for low bone density (osteopenia) at one or more measured sites. The risk of osteoporotic       Review of Systems   Constitutional, HEENT, cardiovascular, pulmonary, GI, , musculoskeletal, neuro, skin, endocrine and psych systems are negative, except as otherwise noted.      Objective    BP (!) 146/82 (Cuff Size: Adult Large)   Pulse 82   Temp 97.8  F (36.6  C) (Tympanic)   Resp 18   Ht 1.626 m (5' 4\")   Wt 118.4 kg (261 lb)   SpO2 97%   BMI 44.80 kg/m    Body mass index is 44.8 kg/m .  Physical Exam   GENERAL: healthy, alert and no distress  NECK: no adenopathy, no asymmetry, masses, or scars and thyroid normal to palpation  RESP: lungs clear to auscultation - no rales, rhonchi or wheezes  CV: regular rate and rhythm, normal S1 S2, no S3 or S4, no murmur, click or rub, no peripheral edema and peripheral pulses strong  ABDOMEN: soft, nontender, no hepatosplenomegaly, no masses and bowel sounds normal  MS: no gross musculoskeletal defects noted, no edema                "

## 2023-07-17 NOTE — NURSING NOTE
"Chief Complaint   Patient presents with     Results     BP (!) 146/82 (Cuff Size: Adult Large)   Pulse 82   Temp 97.8  F (36.6  C) (Tympanic)   Resp 18   Ht 1.626 m (5' 4\")   Wt 118.4 kg (261 lb)   SpO2 97%   BMI 44.80 kg/m   Estimated body mass index is 44.8 kg/m  as calculated from the following:    Height as of this encounter: 1.626 m (5' 4\").    Weight as of this encounter: 118.4 kg (261 lb).  Patient presents to the clinic using No DME      Health Maintenance that is potentially due pending provider review:    Health Maintenance Due   Topic Date Due     COVID-19 Vaccine (1) Never done     ZOSTER IMMUNIZATION (1 of 2) Never done     COLORECTAL CANCER SCREENING  08/17/2017     MEDICARE ANNUAL WELLNESS VISIT  04/01/2022     Pneumococcal Vaccine: 65+ Years (1 - PCV) Never done     ADVANCE CARE PLANNING  09/22/2022                "

## 2023-11-21 NOTE — NURSING NOTE
"Initial /72 (BP Location: Left arm, Patient Position: Chair, Cuff Size: Adult Large)   Pulse 72   Temp 98.1  F (36.7  C) (Tympanic)   Resp 16   Ht 1.613 m (5' 3.5\")   Wt 107.1 kg (236 lb 3.2 oz)   BMI 41.18 kg/m   Estimated body mass index is 41.18 kg/m  as calculated from the following:    Height as of this encounter: 1.613 m (5' 3.5\").    Weight as of this encounter: 107.1 kg (236 lb 3.2 oz). .    Jackelyn Dc CMA ([facility])    " Detail Level: Zone General Sunscreen Counseling: I recommended a broad spectrum sunscreen with a SPF of 30 or higher.  I explained that SPF 30 sunscreens block approximately 97 percent of the sun's harmful rays.  Sunscreens should be applied at least 15 minutes prior to expected sun exposure and then every 2 hours after that as long as sun exposure continues. If swimming or exercising sunscreen should be reapplied every 45 minutes to an hour after getting wet or sweating.  One ounce, or the equivalent of a shot glass full of sunscreen, is adequate to protect the skin not covered by a bathing suit. I also recommended a lip balm with a sunscreen as well. Sun protective clothing can be used in lieu of sunscreen but must be worn the entire time you are exposed to the sun's rays.

## 2024-01-25 NOTE — LETTER
May 3, 2023    To  Natalie Holt  [address] [address]    Your team at [facility] cares about your health. We have reviewed your chart and based on our findings; we are making the following recommendations to better manage your health.     You are in particular need of attention regarding the following:     Schedule Annual MAMMOGRAPHY. [facility] scheduling number is [phone] or schedule in [employer] (self referral).  Call or MyChart message your clinic to schedule a colonoscopy, schedule/ a FIT Test, or order a Cologuard test. If you are unsure what type of test you need, please call your clinic and speak to clinic staff.   Colon cancer is now the second leading cause of cancer-related deaths in the [place] for both men and women and there are over 130,000 new cases and 50,000 deaths per year from colon cancer. Colonoscopies can prevent 90-95% of these deaths. Problem lesions can be removed before they ever become cancer. This test is not only looking for cancer, but also getting rid of precancerous lesions.   If you are under/uninsured, we recommend you contact the [employer] Program.[employer] is a free colorectal cancer screening program that provides colonoscopies for eligible under/uninsured [place] men and women. If you are interested in receiving a free colonoscopy, please call [employer] at t [phone] (mention code [employer]) to see if you're eligible. Please have them send us the results.   PREVENTATIVE VISIT: Annual Medicare Wellness:Schedule an Annual Medicare Wellness Exam. Please call your [facility] clinic to set up your appointment.    If you have already completed these items, please contact the clinic via phone or   [employer] so your care team can review and update your records. Thank you for   choosing [facility] Clinics for your healthcare needs. For any questions,   concerns, or to schedule an appointment please contact our  clinic.    Healthy Regards,      Your  Health [facility] Team                     4 = No assist / stand by assistance

## 2024-06-17 PROBLEM — Z71.89 ADVANCED DIRECTIVES, COUNSELING/DISCUSSION: Status: RESOLVED | Noted: 2017-09-22 | Resolved: 2024-06-17

## 2024-07-23 ENCOUNTER — ALLIED HEALTH/NURSE VISIT (OUTPATIENT)
Dept: FAMILY MEDICINE | Facility: CLINIC | Age: 69
End: 2024-07-23
Payer: COMMERCIAL

## 2024-07-23 ENCOUNTER — OFFICE VISIT (OUTPATIENT)
Dept: SURGERY | Facility: CLINIC | Age: 69
End: 2024-07-23
Payer: MEDICARE

## 2024-07-23 VITALS
BODY MASS INDEX: 45.85 KG/M2 | TEMPERATURE: 98.2 F | WEIGHT: 268.6 LBS | HEIGHT: 64 IN | DIASTOLIC BLOOD PRESSURE: 95 MMHG | HEART RATE: 73 BPM | SYSTOLIC BLOOD PRESSURE: 164 MMHG

## 2024-07-23 VITALS — DIASTOLIC BLOOD PRESSURE: 84 MMHG | SYSTOLIC BLOOD PRESSURE: 136 MMHG | HEART RATE: 72 BPM

## 2024-07-23 DIAGNOSIS — Z01.30 BLOOD PRESSURE CHECK: Primary | ICD-10-CM

## 2024-07-23 DIAGNOSIS — E66.813 CLASS 3 SEVERE OBESITY DUE TO EXCESS CALORIES WITH SERIOUS COMORBIDITY AND BODY MASS INDEX (BMI) OF 45.0 TO 49.9 IN ADULT (H): Primary | ICD-10-CM

## 2024-07-23 DIAGNOSIS — E66.01 CLASS 3 SEVERE OBESITY DUE TO EXCESS CALORIES WITH SERIOUS COMORBIDITY AND BODY MASS INDEX (BMI) OF 45.0 TO 49.9 IN ADULT (H): Primary | ICD-10-CM

## 2024-07-23 DIAGNOSIS — K43.2 INCISIONAL HERNIA, WITHOUT OBSTRUCTION OR GANGRENE: ICD-10-CM

## 2024-07-23 PROCEDURE — 99213 OFFICE O/P EST LOW 20 MIN: CPT | Performed by: SURGERY

## 2024-07-23 PROCEDURE — 99207 PR NO CHARGE NURSE ONLY: CPT

## 2024-07-23 ASSESSMENT — PAIN SCALES - GENERAL: PAINLEVEL: NO PAIN (0)

## 2024-07-23 NOTE — PROGRESS NOTES
Surgical Consultation/History and Physical  [facility] General Surgery    Natalie is seen in consultation for Hernia, at the request of RUBI PARK NP, LXEX CNP (Inactive).    Chief Complaint:  Hernia    History of Present Illness: Natalie Holt is a 69 year old female presents with hernia.  Patient has had hernia for some time.  Admits increasing size.  Noted increased size following heavy lifting.  Causes no pain at present.  She finds this unsightly.  Denies obstipation, nausea, vomiting.      Patient Active Problem List   Diagnosis    Post-menopausal bleeding    Abnormal Pap smear    CARDIOVASCULAR SCREENING; LDL GOAL LESS THAN 160    24 hour contact handout given    Other symptoms involving nervous and musculoskeletal systems(781.99)    Tubular adenoma of colon    Hypothyroidism due to acquired atrophy of thyroid    Elevated glucose    Vitamin D deficiency    History of colonic polyps    Morbid obesity due to excess calories (H)    Elevated liver function tests    Overflow incontinence     Past Medical History:   Diagnosis Date    Anxiety     CAD (coronary artery disease) 4/29/2011    Generalised anxiety disorder 1/12/2010    Hypothyroidism     Post-menopausal bleeding 1/22/2010     Past Surgical History:   Procedure Laterality Date    C SOLID INSERT,FOAM,HOOKON HARDWARE      Removal of 3 pins from her Left ankle    ZZC ANESTH,ANKLE REPLACEMENT      Left ankle, 3 pin inserted.    ZZC ANESTH,ELBOW AREA SURGERY      Nasim inserted into her left elbow.    ZZC FACIAL REPLACEMENT      Repair to the right side of her temple.     Family History   Problem Relation Age of Onset    Gynecology Daughter         Cyst on her ovary     Social History     Tobacco Use    Smoking status: Never     Passive exposure: Never    Smokeless tobacco: Never    Tobacco comments:     Social smoker on rare occsions.   Substance Use Topics    Alcohol use: Yes     Comment: Occasional beer per week.      History   Drug Use No     Current  "Outpatient Medications   Medication Sig Dispense Refill    levothyroxine (SYNTHROID/LEVOTHROID) 25 MCG tablet Take 1 tablet (25 mcg) by mouth daily 90 tablet 3     Allergies   Allergen Reactions    Nkda [No Known Drug Allergy]      Review of Systems:   5 point ROS otherwise negative    Physical Exam:  BP (!) 164/95 (BP Location: Right arm, Patient Position: Sitting, Cuff Size: Adult Large)   Pulse 73   Temp 98.2  F (36.8  C) (Tympanic)   Ht 1.626 m (5' 4\")   Wt 121.8 kg (268 lb 9.6 oz)   BMI 46.11 kg/m      Constitutional- No acute distress, well nourished, non-toxic  Eyes: Anicteric, no injection.  PERRL  ENT:  Normocephalic, atraumatic, Nose midline, moist mucus membranes  Neck - supple, no LAD  Respiratory- Good inspiratory effort  Cardiovascular - 1+ pitting edema bilateral extremities, chronic venous stasis changes No clubbing.  Abdomen - Soft, non-tender,obese, incarcerated umbilical hernia, incision superior to umbilicus from cholecystectomy.  No ulceration.   Neuro - No focal neuro deficits, Alert and oriented x 3  Psych: Appropriate mood and affect  Musculoskeletal: Normal gait, symmetric strength.  FROM upper and lower extremities.  Skin: Warm, Dry    Assessment:  1. Class 3 severe obesity due to excess calories with serious comorbidity and body mass index (BMI) of 45.0 to 49.9 in adult (H)    2. Incisional hernia, without obstruction or gangrene      Plan:   Natalie Holt presents with relatively asymptomatic incisional hernia at her umbilicus.  Discussed patients weight being prohibitive for long term durability of hernia repair.  We discussed hernia repair, robotic and open approaches.  We reviewed signs symptoms of incarceration and strangulation.  I recommend significant weight loss prior to proceeding with any surgical intervention in the absence of symptoms.  I advised referral to comprehensive weight loss clinic, she expressed some interest in banding (I discussed this is not done any " longer) but there are alternatives.  Follow-up when achieves goal weight (goal weight loss of 70 lbs to obtain BMI under 35).       Natalie to follow up with Primary Care provider regarding elevated blood pressure.      Ty Hadley DO on 7/23/2024 at 7:13 AM

## 2024-07-23 NOTE — PROGRESS NOTES
Natalie Holt is a 69 year old year old patient who comes in today for a Blood Pressure check because she was at a surgery consultation this morning in [place] and her BP was elevated. She was advised to stop at her primary care clinic to follow up. Her former PCP is no longer at her previous clinic.she plans to change to [facility].   Vital Signs as repeated by /90 P 72, 136/84  Patient is taking medication as prescribed, she is only on levothyroxine.   Patient is tolerating medications well.  Patient is not monitoring Blood Pressure at home.    Current complaints: none  Disposition:  patient instructed to Schedule an appointment to establish care and have annual physical and labs. Advised she will need labs also. She did schedule with Dr Martínez for 08/01/24.   BP Readings from Last 3 Encounters:   07/23/24 136/84   07/23/24 (!) 164/95   07/17/23 (!) 146/82     Celsa PRUITT RN

## 2024-07-23 NOTE — LETTER
7/23/2024      Natalie Holt  [address] [address]      Dear Colleague,    Thank you for referring your patient, Natalie Holt, to the [facility]. Please see a copy of my visit note below.    Surgical Consultation/History and Physical  [facility]    Natalie is seen in consultation for Hernia, at the request of RUBI PARK NP, APRN CNP (Inactive).    Chief Complaint:  Hernia    History of Present Illness: Natalie Holt is a 69 year old female presents with hernia.  Patient has had hernia for some time.  Admits increasing size.  Noted increased size following heavy lifting.  Causes no pain at present.  She finds this unsightly.  Denies obstipation, nausea, vomiting.      Patient Active Problem List   Diagnosis     Post-menopausal bleeding     Abnormal Pap smear     CARDIOVASCULAR SCREENING; LDL GOAL LESS THAN 160     24 hour contact handout given     Other symptoms involving nervous and musculoskeletal systems(781.99)     Tubular adenoma of colon     Hypothyroidism due to acquired atrophy of thyroid     Elevated glucose     Vitamin D deficiency     History of colonic polyps     Morbid obesity due to excess calories (H)     Elevated liver function tests     Overflow incontinence     Past Medical History:   Diagnosis Date     Anxiety      CAD (coronary artery disease) 4/29/2011     Generalised anxiety disorder 1/12/2010     Hypothyroidism      Post-menopausal bleeding 1/22/2010     Past Surgical History:   Procedure Laterality Date     C SOLID INSERT,FOAM,HOOKON HARDWARE      Removal of 3 pins from her Left ankle     ZZC ANESTH,ANKLE REPLACEMENT      Left ankle, 3 pin inserted.     ZZC ANESTH,ELBOW AREA SURGERY      Nasim inserted into her left elbow.     ZZC FACIAL REPLACEMENT      Repair to the right side of her temple.     Family History   Problem Relation Age of Onset     Gynecology Daughter         Cyst on her ovary     Social History     Tobacco Use  "    Smoking status: Never     Passive exposure: Never     Smokeless tobacco: Never     Tobacco comments:     Social smoker on rare occsions.   Substance Use Topics     Alcohol use: Yes     Comment: Occasional beer per week.      History   Drug Use No     Current Outpatient Medications   Medication Sig Dispense Refill     levothyroxine (SYNTHROID/LEVOTHROID) 25 MCG tablet Take 1 tablet (25 mcg) by mouth daily 90 tablet 3     Allergies   Allergen Reactions     Nkda [No Known Drug Allergy]      Review of Systems:   5 point ROS otherwise negative    Physical Exam:  BP (!) 164/95 (BP Location: Right arm, Patient Position: Sitting, Cuff Size: Adult Large)   Pulse 73   Temp 98.2  F (36.8  C) (Tympanic)   Ht 1.626 m (5' 4\")   Wt 121.8 kg (268 lb 9.6 oz)   BMI 46.11 kg/m      Constitutional- No acute distress, well nourished, non-toxic  Eyes: Anicteric, no injection.  PERRL  ENT:  Normocephalic, atraumatic, Nose midline, moist mucus membranes  Neck - supple, no LAD  Respiratory- Good inspiratory effort  Cardiovascular - 1+ pitting edema bilateral extremities, chronic venous stasis changes No clubbing.  Abdomen - Soft, non-tender,obese, incarcerated umbilical hernia, incision superior to umbilicus from cholecystectomy.  No ulceration.   Neuro - No focal neuro deficits, Alert and oriented x 3  Psych: Appropriate mood and affect  Musculoskeletal: Normal gait, symmetric strength.  FROM upper and lower extremities.  Skin: Warm, Dry    Assessment:  1. Class 3 severe obesity due to excess calories with serious comorbidity and body mass index (BMI) of 45.0 to 49.9 in adult (H)    2. Incisional hernia, without obstruction or gangrene      Plan:   Natalie Holt presents with relatively asymptomatic incisional hernia at her umbilicus.  Discussed patients weight being prohibitive for long term durability of hernia repair.  We discussed hernia repair, robotic and open approaches.  We reviewed signs symptoms of incarceration and " strangulation.  I recommend significant weight loss prior to proceeding with any surgical intervention in the absence of symptoms.  I advised referral to comprehensive weight loss clinic, she expressed some interest in banding (I discussed this is not done any longer) but there are alternatives.  Follow-up when achieves goal weight (goal weight loss of 70 lbs to obtain BMI under 35).       Natalie to follow up with Primary Care provider regarding elevated blood pressure.      Ty Hadley,  on 7/23/2024 at 7:13 AM      Again, thank you for allowing me to participate in the care of your patient.        Sincerely,        Ty Hadley, DO

## 2024-07-23 NOTE — NURSING NOTE
"Initial BP (!) 164/95 (BP Location: Right arm, Patient Position: Sitting, Cuff Size: Adult Large)   Pulse 73   Temp 98.2  F (36.8  C) (Tympanic)   Ht 1.626 m (5' 4\")   Wt 121.8 kg (268 lb 9.6 oz)   BMI 46.11 kg/m   Estimated body mass index is 46.11 kg/m  as calculated from the following:    Height as of this encounter: 1.626 m (5' 4\").    Weight as of this encounter: 121.8 kg (268 lb 9.6 oz). .  Aylin Hay MA    "

## 2024-08-01 ENCOUNTER — OFFICE VISIT (OUTPATIENT)
Dept: FAMILY MEDICINE | Facility: CLINIC | Age: 69
End: 2024-08-01
Payer: MEDICARE

## 2024-08-01 ENCOUNTER — TELEPHONE (OUTPATIENT)
Dept: FAMILY MEDICINE | Facility: CLINIC | Age: 69
End: 2024-08-01

## 2024-08-01 VITALS
BODY MASS INDEX: 45.41 KG/M2 | HEART RATE: 82 BPM | OXYGEN SATURATION: 95 % | RESPIRATION RATE: 18 BRPM | WEIGHT: 266 LBS | DIASTOLIC BLOOD PRESSURE: 80 MMHG | HEIGHT: 64 IN | TEMPERATURE: 98 F | SYSTOLIC BLOOD PRESSURE: 130 MMHG

## 2024-08-01 DIAGNOSIS — Z12.11 SCREEN FOR COLON CANCER: ICD-10-CM

## 2024-08-01 DIAGNOSIS — E66.813 CLASS 3 SEVERE OBESITY DUE TO EXCESS CALORIES WITH SERIOUS COMORBIDITY AND BODY MASS INDEX (BMI) OF 45.0 TO 49.9 IN ADULT (H): ICD-10-CM

## 2024-08-01 DIAGNOSIS — E03.4 HYPOTHYROIDISM DUE TO ACQUIRED ATROPHY OF THYROID: ICD-10-CM

## 2024-08-01 DIAGNOSIS — Z12.31 VISIT FOR SCREENING MAMMOGRAM: ICD-10-CM

## 2024-08-01 DIAGNOSIS — R73.03 PREDIABETES: ICD-10-CM

## 2024-08-01 DIAGNOSIS — E66.01 CLASS 3 SEVERE OBESITY DUE TO EXCESS CALORIES WITH SERIOUS COMORBIDITY AND BODY MASS INDEX (BMI) OF 45.0 TO 49.9 IN ADULT (H): ICD-10-CM

## 2024-08-01 DIAGNOSIS — Z00.00 ENCOUNTER FOR MEDICARE ANNUAL WELLNESS EXAM: Primary | ICD-10-CM

## 2024-08-01 LAB
HBA1C MFR BLD: 5.8 % (ref 0–5.6)
TSH SERPL DL<=0.005 MIU/L-ACNC: 2.53 UIU/ML (ref 0.3–4.2)

## 2024-08-01 PROCEDURE — 83036 HEMOGLOBIN GLYCOSYLATED A1C: CPT | Performed by: STUDENT IN AN ORGANIZED HEALTH CARE EDUCATION/TRAINING PROGRAM

## 2024-08-01 PROCEDURE — 36415 COLL VENOUS BLD VENIPUNCTURE: CPT | Performed by: STUDENT IN AN ORGANIZED HEALTH CARE EDUCATION/TRAINING PROGRAM

## 2024-08-01 PROCEDURE — G0438 PPPS, INITIAL VISIT: HCPCS | Performed by: STUDENT IN AN ORGANIZED HEALTH CARE EDUCATION/TRAINING PROGRAM

## 2024-08-01 PROCEDURE — 84443 ASSAY THYROID STIM HORMONE: CPT | Performed by: STUDENT IN AN ORGANIZED HEALTH CARE EDUCATION/TRAINING PROGRAM

## 2024-08-01 PROCEDURE — 99214 OFFICE O/P EST MOD 30 MIN: CPT | Mod: 25 | Performed by: STUDENT IN AN ORGANIZED HEALTH CARE EDUCATION/TRAINING PROGRAM

## 2024-08-01 SDOH — HEALTH STABILITY: PHYSICAL HEALTH: ON AVERAGE, HOW MANY DAYS PER WEEK DO YOU ENGAGE IN MODERATE TO STRENUOUS EXERCISE (LIKE A BRISK WALK)?: 6 DAYS

## 2024-08-01 ASSESSMENT — SOCIAL DETERMINANTS OF HEALTH (SDOH): HOW OFTEN DO YOU GET TOGETHER WITH FRIENDS OR RELATIVES?: MORE THAN THREE TIMES A WEEK

## 2024-08-01 ASSESSMENT — PAIN SCALES - GENERAL: PAINLEVEL: NO PAIN (0)

## 2024-08-01 NOTE — TELEPHONE ENCOUNTER
Prior Authorization Retail Medication Request    Medication/Dose: ZEPBOUND 2.5MG/0.5ML SOAJ  Diagnosis and ICD code (if different than what is on RX):    New/renewal/insurance change PA/secondary ins. PA:  Previously Tried and Failed:    Rationale:      Insurance   Primary: [facility] FEDERAL PART D   Insurance ID: [id number]      Pharmacy Information (if different than what is on RX)  Name: [facility]   Phone: [phone]        Thank you,  Beverley Dia, Pharm Tech  [facility]

## 2024-08-01 NOTE — LETTER
August 1, 2024      Natalie Holt  [address] [address]        Dear ,    We are writing to inform you of your test results. The thyroid is well controlled. The hemoglobin A1c is still in the prediabetes range, similar to where it has been years ago as well.     Resulted Orders   TSH WITH FREE T4 REFLEX   Result Value Ref Range    TSH 2.53 0.30 - 4.20 uIU/mL   Hemoglobin A1c   Result Value Ref Range    Hemoglobin A1C 5.8 (H) 0.0 - 5.6 %      Comment:      Normal <5.7%   Prediabetes 5.7-6.4%    Diabetes 6.5% or higher     Note: Adopted from ADA consensus guidelines.       If you have any questions or concerns, please call the clinic at the number listed above.       Sincerely,      Stacy Martínez MD

## 2024-08-01 NOTE — PATIENT INSTRUCTIONS
Patient Education   Preventive Care Advice   This is general advice given by our system to help you stay healthy. However, your care team may have specific advice just for you. Please talk to your care team about your preventive care needs.  Nutrition  Eat 5 or more servings of fruits and vegetables each day.  Try wheat bread, brown rice and whole grain pasta (instead of white bread, rice, and pasta).  Get enough calcium and vitamin D. Check the label on foods and aim for 100% of the RDA (recommended daily allowance).  Lifestyle  Exercise at least 150 minutes each week  (30 minutes a day, 5 days a week).  Do muscle strengthening activities 2 days a week. These help control your weight and prevent disease.  No smoking.  Wear sunscreen to prevent skin cancer.  Have a dental exam and cleaning every 6 months.  Yearly exams  See your health care team every year to talk about:  Any changes in your health.  Any medicines your care team has prescribed.  Preventive care, family planning, and ways to prevent chronic diseases.  Shots (vaccines)   HPV shots (up to age 26), if you've never had them before.  Hepatitis B shots (up to age 59), if you've never had them before.  COVID-19 shot: Get this shot when it's due.  Flu shot: Get a flu shot every year.  Tetanus shot: Get a tetanus shot every 10 years.  Pneumococcal, hepatitis A, and RSV shots: Ask your care team if you need these based on your risk.  Shingles shot (for age 50 and up)  General health tests  Diabetes screening:  Starting at age 35, Get screened for diabetes at least every 3 years.  If you are younger than age 35, ask your care team if you should be screened for diabetes.  Cholesterol test: At age 39, start having a cholesterol test every 5 years, or more often if advised.  Bone density scan (DEXA): At age 50, ask your care team if you should have this scan for osteoporosis (brittle bones).  Hepatitis C: Get tested at least once in your life.  STIs (sexually  transmitted infections)  Before age 24: Ask your care team if you should be screened for STIs.  After age 24: Get screened for STIs if you're at risk. You are at risk for STIs (including HIV) if:  You are sexually active with more than one person.  You don't use condoms every time.  You or a partner was diagnosed with a sexually transmitted infection.  If you are at risk for HIV, ask about PrEP medicine to prevent HIV.  Get tested for HIV at least once in your life, whether you are at risk for HIV or not.  Cancer screening tests  Cervical cancer screening: If you have a cervix, begin getting regular cervical cancer screening tests starting at age 21.  Breast cancer scan (mammogram): If you've ever had breasts, begin having regular mammograms starting at age 40. This is a scan to check for breast cancer.  Colon cancer screening: It is important to start screening for colon cancer at age 45.  Have a colonoscopy test every 10 years (or more often if you're at risk) Or, ask your provider about stool tests like a FIT test every year or Cologuard test every 3 years.  To learn more about your testing options, visit:   .  For help making a decision, visit:   https://bit.ly/xf63441.  Prostate cancer screening test: If you have a prostate, ask your care team if a prostate cancer screening test (PSA) at age 55 is right for you.  Lung cancer screening: If you are a current or former smoker ages 50 to 80, ask your care team if ongoing lung cancer screenings are right for you.  For informational purposes only. Not to replace the advice of your health care provider. Copyright   2023 [facility] [employer]. All rights reserved. Clinically reviewed by the [facility] Transitions Program. My Visual Brief 913815 - REV 01/24.  Bladder Training: Care Instructions  Your Care Instructions     Bladder training is used to treat urge incontinence and stress incontinence. Urge incontinence means that the need to urinate comes on so fast  that you can't get to a toilet in time. Stress incontinence means that you leak urine because of pressure on your bladder. For example, it may happen when you laugh, cough, or lift something heavy.  Bladder training can increase how long you can wait before you have to urinate. It can also help your bladder hold more urine. And it can give you better control over the urge to urinate.  It is important to remember that bladder training takes a few weeks to a few months to make a difference. You may not see results right away, but don't give up.  Follow-up care is a key part of your treatment and safety. Be sure to make and go to all appointments, and call your doctor if you are having problems. It's also a good idea to know your test results and keep a list of the medicines you take.  How can you care for yourself at home?  Work with your doctor to come up with a bladder training program that is right for you. You may use one or more of the following methods.  Delayed urination  In the beginning, try to keep from urinating for 5 minutes after you first feel the need to go.  While you wait, take deep, slow breaths to relax. Kegel exercises can also help you delay the need to go to the bathroom.  After some practice, when you can easily wait 5 minutes to urinate, try to wait 10 minutes before you urinate.  Slowly increase the waiting period until you are able to control when you have to urinate.  Scheduled urination  Empty your bladder when you first wake up in the morning.  Schedule times throughout the day when you will urinate.  Start by going to the bathroom every hour, even if you don't need to go.  Slowly increase the time between trips to the bathroom.  When you have found a schedule that works well for you, keep doing it.  If you wake up during the night and have to urinate, do it. Apply your schedule to waking hours only.  Kegel exercises  These tighten and strengthen pelvic muscles, which can help you control  "the flow of urine. (If doing these exercises causes pain, stop doing them and talk with your doctor.) To do Kegel exercises:  Squeeze your muscles as if you were trying not to pass gas. Or squeeze your muscles as if you were stopping the flow of urine. Your belly, legs, and buttocks shouldn't move.  Hold the squeeze for 3 seconds, then relax for 5 to 10 seconds.  Start with 3 seconds, then add 1 second each week until you are able to squeeze for 10 seconds.  Repeat the exercise 10 times a session. Do 3 to 8 sessions a day.  When should you call for help?  Watch closely for changes in your health, and be sure to contact your doctor if:    Your incontinence is getting worse.     You do not get better as expected.   Where can you learn more?  Go to https://www.[employer].net/patiented  Enter V684 in the search box to learn more about \"Bladder Training: Care Instructions.\"  Current as of: November 15, 2023               Content Version: 14.0    3554-9950 [employer].   Care instructions adapted under license by your healthcare professional. If you have questions about a medical condition or this instruction, always ask your healthcare professional. [employer] disclaims any warranty or liability for your use of this information.         "

## 2024-08-01 NOTE — PROGRESS NOTES
Preventive Care Visit  [facility]  Stacy Martínez MD, Family Medicine  Aug 1, 2024      Assessment & Plan     Encounter for Medicare annual wellness exam  Patient is a 69-year-old female presents today for annual visit.  She declines vaccinations    Hypothyroidism due to acquired atrophy of thyroid  History of hypothyroidism, due for annual TSH check which is obtained today.  - TSH WITH FREE T4 REFLEX  - TSH WITH FREE T4 REFLEX    Screen for colon cancer  Discussed options for colon cancer screening, referral for colonoscopy is placed.  - Colonoscopy Screening  Referral    Visit for screening mammogram  Will be due for mammogram, does have implants, order placed.  - MA Screen with Implants Bilateral w/[facility]    Prediabetes  History of prediabetes, repeat A1c is obtained today and remains in the prediabetes range.  We discussed trying stepdown as below for weight.  - Hemoglobin A1c  - tirzepatide-Weight Management (ZEPBOUND) 7.5 MG/0.5ML prefilled pen  Dispense: 2 mL; Refill: 0  - tirzepatide-Weight Management (ZEPBOUND) 5 MG/0.5ML prefilled pen  Dispense: 2 mL; Refill: 0  - tirzepatide-Weight Management (ZEPBOUND) 2.5 MG/0.5ML prefilled pen  Dispense: 2 mL; Refill: 0  - Hemoglobin A1c    Class 3 severe obesity due to excess calories with serious comorbidity and body mass index (BMI) of 45.0 to 49.9 in adult (H)  Patient notes that she is the largest weight now that she is ever been.  Has difficulty with staying active due to chronic knee pain in part related to obesity.  We discussed options, which may be limited due to Medicare coverage or lack thereof.  Will trial Zabad and see if it is covered by her supplemental.  If it is, follow-up in 3 months after initiation.  If not, consider alternative oral medication such as topiramate.  - tirzepatide-Weight Management (ZEPBOUND) 7.5 MG/0.5ML prefilled pen  Dispense: 2 mL; Refill: 0  - tirzepatide-Weight Management (ZEPBOUND) 5  "MG/0.5ML prefilled pen  Dispense: 2 mL; Refill: 0  - tirzepatide-Weight Management (ZEPBOUND) 2.5 MG/0.5ML prefilled pen  Dispense: 2 mL; Refill: 0      Patient has been advised of split billing requirements and indicates understanding: Yes        BMI  Estimated body mass index is 45.66 kg/m  as calculated from the following:    Height as of this encounter: 1.626 m (5' 4\").    Weight as of this encounter: 120.7 kg (266 lb).     Counseling  Appropriate preventive services were addressed with this patient via screening, questionnaire, or discussion as appropriate for fall prevention, nutrition, physical activity, Tobacco-use cessation, weight loss and cognition.  Checklist reviewing preventive services available has been given to the patient.  Reviewed patient's diet, addressing concerns and/or questions.   Information on urinary incontinence and treatment options given to patient.       Cornelia Estrada is a 69 year old, presenting for the following:  Wellness Visit        8/1/2024    10:32 AM   Additional Questions   Roomed by Ashia BATISTA   Accompanied by Self         Health Care Directive  Patient does not have a Health Care Directive or Living Will: Discussed advance care planning with patient; information given to patient to review.    HPI        8/1/2024   General Health   How would you rate your overall physical health? Excellent   Feel stress (tense, anxious, or unable to sleep) Not at all            8/1/2024   Nutrition   Diet: Regular (no restrictions)            8/1/2024   Exercise   Days per week of moderate/strenous exercise 6 days            8/1/2024   Social Factors   Frequency of gathering with friends or relatives More than three times a week   Worry food won't last until get money to buy more No   Food not last or not have enough money for food? No   Do you have housing? (Housing is defined as stable permanent housing and does not include staying ouside in a car, in a tent, in an abandoned building, " in an overnight shelter, or couch-surfing.) Yes   Are you worried about losing your housing? No   Lack of transportation? No   Unable to get utilities (heat,electricity)? No            8/1/2024   Fall Risk   Fallen 2 or more times in the past year? No    No   Trouble with walking or balance? No    No       Multiple values from one day are sorted in reverse-chronological order          8/1/2024   Activities of Daily Living- Home Safety   Needs help with the following daily activites None of the above   Safety concerns in the home None of the above            8/1/2024   Dental   Dentist two times every year? Yes            8/1/2024   Hearing Screening   Hearing concerns? None of the above            8/1/2024   Driving Risk Screening   Patient/family members have concerns about driving No            8/1/2024   General Alertness/Fatigue Screening   Have you been more tired than usual lately? No            8/1/2024   Urinary Incontinence Screening   Bothered by leaking urine in past 6 months Yes            8/1/2024   TB Screening   Were you born outside of the [place]? No            Today's PHQ-2 Score:       8/1/2024    10:05 AM   PHQ-2 ( 1999 [employer])   Q1: Little interest or pleasure in doing things 0   Q2: Feeling down, depressed or hopeless 0   PHQ-2 Score 0   Q1: Little interest or pleasure in doing things Not at all   Q2: Feeling down, depressed or hopeless Not at all   PHQ-2 Score 0           8/1/2024   Substance Use   Alcohol more than 3/day or more than 7/wk Not Applicable   Do you have a current opioid prescription? No   How severe/bad is pain from 1 to 10? 0/10 (No Pain)   Do you use any other substances recreationally? No        Social History     Tobacco Use    Smoking status: Never     Passive exposure: Never    Smokeless tobacco: Never    Tobacco comments:     Social smoker on rare occsions.   Vaping Use    Vaping status: Never Used   Substance Use Topics    Alcohol use: Yes     Comment: Occasional beer per  week.    Drug use: No           6/27/2023   LAST FHS-7 RESULTS   1st degree relative breast or ovarian cancer No   Any relative bilateral breast cancer No   Any male have breast cancer No   Any ONE woman have BOTH breast AND ovarian cancer No   Any woman with breast cancer before 50yrs No   2 or more relatives with breast AND/OR ovarian cancer No   2 or more relatives with breast AND/OR bowel cancer No           Mammogram Screening - Mammogram every 1-2 years updated in Health Maintenance based on mutual decision making      History of abnormal Pap smear: No - age 65 or older with adequate negative prior screening test results (3 consecutive negative cytology results, 2 consecutive negative cotesting results, or 2 consecutive negative HrHPV test results within 10 years, with the most recent test occurring within the recommended screening interval for the test used)        Latest Ref Rng & Units 9/22/2017    10:50 AM 9/22/2017    10:48 AM 4/30/2014    12:00 AM   PAP / HPV   PAP (Historical)   NIL  NIL    HPV 16 DNA NEG^Negative Negative      HPV 18 DNA NEG^Negative Negative      Other HR HPV NEG^Negative Negative        ASCVD Risk   The 10-year ASCVD risk score (Alex DK, et al., 2019) is: 6.4%    Values used to calculate the score:      Age: 69 years      Sex: Female      Is Non- : No      Diabetic: No      Tobacco smoker: No      Systolic Blood Pressure: 130 mmHg      Is BP treated: No      HDL Cholesterol: 60 mg/dL      Total Cholesterol: 169 mg/dL          Reviewed and updated as needed this visit by Provider                  Current providers sharing in care for this patient include:  Patient Care Team:  Winnie Roldan APRN CNP (Inactive) as PCP - Hardy Colvin MD as Assigned PCP  Ty Hadley DO as Physician (Surgery)    The following health maintenance items are reviewed in Epic and correct as of today:  Health Maintenance   Topic Date Due     "ZOSTER IMMUNIZATION (1 of 2) Never done    RSV VACCINE (Pregnancy & 60+) (1 - 1-dose 60+ series) Never done    COLORECTAL CANCER SCREENING  08/17/2017    Pneumococcal Vaccine: 65+ Years (1 of 1 - PCV) Never done    GLUCOSE  10/25/2022    COVID-19 Vaccine (1 - 2023-24 season) Never done    ANNUAL REVIEW OF HM ORDERS  06/05/2024    MAMMO SCREENING  06/27/2024    INFLUENZA VACCINE (1) 09/01/2024    MEDICARE ANNUAL WELLNESS VISIT  08/01/2025    TSH W/FREE T4 REFLEX  08/01/2025    FALL RISK ASSESSMENT  08/01/2025    DTAP/TDAP/TD IMMUNIZATION (3 - Td or Tdap) 06/29/2026    LIPID  06/05/2028    ADVANCE CARE PLANNING  08/01/2029    DEXA  06/27/2038    HEPATITIS C SCREENING  Completed    PHQ-2 (once per calendar year)  Completed    IPV IMMUNIZATION  Aged Out    HPV IMMUNIZATION  Aged Out    MENINGITIS IMMUNIZATION  Aged Out    RSV MONOCLONAL ANTIBODY  Aged Out    PAP  Discontinued         Review of Systems  Constitutional, HEENT, cardiovascular, pulmonary, gi and gu systems are negative, except as otherwise noted.     Objective    Exam  /80 (BP Location: Right arm, Patient Position: Sitting, Cuff Size: Adult Large)   Pulse 82   Temp 98  F (36.7  C) (Tympanic)   Resp 18   Ht 1.626 m (5' 4\")   Wt 120.7 kg (266 lb)   SpO2 95%   BMI 45.66 kg/m     Estimated body mass index is 45.66 kg/m  as calculated from the following:    Height as of this encounter: 1.626 m (5' 4\").    Weight as of this encounter: 120.7 kg (266 lb).    Physical Exam  GENERAL: alert and no distress  EYES: Eyes grossly normal to inspection, PERRL and conjunctivae and sclerae normal  HENT: ear canals and TM's normal, nose and mouth without ulcers or lesions  NECK: no adenopathy, no asymmetry, masses, or scars  RESP: Wheezes bilateral lungs diffusely.    CV: regular rate and rhythm, normal S1 S2, no S3 or S4, no murmur, click or rub, no peripheral edema  ABDOMEN: soft, nontender, no hepatosplenomegaly, no masses and bowel sounds normal  MS: no gross " musculoskeletal defects noted, no edema  SKIN: no suspicious lesions or rashes  NEURO: Normal strength and tone, mentation intact and speech normal  PSYCH: mentation appears normal, affect normal/bright         8/1/2024   Mini Cog   Clock Draw Score 2 Normal   3 Item Recall 3 objects recalled   Mini Cog Total Score 5                 Signed Electronically by: Stacy Martínez MD

## 2024-08-02 ENCOUNTER — PATIENT OUTREACH (OUTPATIENT)
Dept: GASTROENTEROLOGY | Facility: CLINIC | Age: 69
End: 2024-08-02
Payer: MEDICARE

## 2024-08-05 NOTE — TELEPHONE ENCOUNTER
Central Prior Authorization Team   Phone: [phone]    PA Initiation    Medication: ZEPBOUND 2.5MG/0.5ML SOAJ  Insurance Company: [employer] - Phone [phone] Fax [phone]  Pharmacy Filling the Rx: [facility] [place], [place] - [address] [place] [address]  Filling Pharmacy Phone: [phone]  Filling Pharmacy Fax:    Start Date: 8/5/2024

## 2024-08-06 NOTE — TELEPHONE ENCOUNTER
Prior Authorization Approval    Authorization Effective Date: 5/7/2024  Authorization Expiration Date: 8/5/2025  Medication: ZEPBOUND 2.5MG/0.5ML SOAJ  Reference #:     Insurance Company: [employer] - Phone [phone] Fax [phone]  Which Pharmacy is filling the prescription (Not needed for infusion/clinic administered): [facility] [place] [id number] [place] [address]  Pharmacy Notified: Yes  Patient Notified: Instructed pharmacy to notify patient when script is ready to /ship.

## 2024-09-16 DIAGNOSIS — E03.4 HYPOTHYROIDISM DUE TO ACQUIRED ATROPHY OF THYROID: ICD-10-CM

## 2024-09-17 RX ORDER — LEVOTHYROXINE SODIUM 25 UG/1
25 TABLET ORAL DAILY
Qty: 90 TABLET | Refills: 3 | Status: SHIPPED | OUTPATIENT
Start: 2024-09-17

## 2025-07-10 ENCOUNTER — TELEPHONE (OUTPATIENT)
Dept: FAMILY MEDICINE | Facility: CLINIC | Age: 70
End: 2025-07-10
Payer: MEDICARE

## 2025-07-10 NOTE — LETTER
July 10, 2025    Natalie SAM Holt    [address] [facility], [place] [address]    Hello,     Your care team at [facility] values your health and well-being. After reviewing your chart, we have identified recommendation(s) to help you better manage your health.    It's time for your Annual Wellness visit. During your visit, we'll discuss your health, well-being, and any questions you may have related to preventive care. We'll also review any recommended tests, exams, or screenings you might need. To schedule please call [phone] ([phone]) or use your [employer] account.    If you recently had or are having any of these services soon, please contact the clinic via phone or [employer] so that your care team can update your records.    We look forward to seeing you at your upcoming visit.    If you have any questions or concerns, please contact our clinic. Thank you for continuing to trust us with your care.    Sincerely,    Your [facility] Care Team

## 2025-07-10 NOTE — TELEPHONE ENCOUNTER
Patient Quality Outreach    Patient is due for the following:   Colon Cancer Screening  Physical Annual Wellness Visit    Action(s) Taken:   Schedule a Annual Wellness Visit    Type of outreach:    Sent letter.    Questions for provider review:    None         Ashia Seaman MA  Chart routed to None.

## 2025-07-10 NOTE — Clinical Note
July 10, 2025      Natalie Holt  [address] [address]        {Comm Man dear:[id number]}          Sincerely,        Stacy Martínez MD    Electronically signed

## 2025-07-30 ENCOUNTER — PATIENT OUTREACH (OUTPATIENT)
Dept: GASTROENTEROLOGY | Facility: CLINIC | Age: 70
End: 2025-07-30
Payer: MEDICARE

## 2025-07-30 DIAGNOSIS — Z12.11 SPECIAL SCREENING FOR MALIGNANT NEOPLASMS, COLON: Primary | ICD-10-CM

## 2025-07-30 NOTE — PROGRESS NOTES
"Hx adenomatous polyp with 5yr recall recommended on last colonoscopy performed in 2012    CRC Screening Colonoscopy Referral Review    Patient meets the inclusion criteria for screening colonoscopy standing order.    Ordering/Referring Provider:  Stacy Martínez MD        BMI: Estimated body mass index is 45.66 kg/m  as calculated from the following:    Height as of 8/1/24: 1.626 m (5' 4\").    Weight as of 8/1/24: 120.7 kg (266 lb).     Sedation:  Does patient have any of the following conditions affecting sedation?  No medical conditions affecting sedation.    Previous Scopes:  Any previous recommendations or follow up needs based on previous scope?  na / No recommendations.    Medical Concerns to Postpone Order:  Does patient have any of the following medical concerns that should postpone/delay colonoscopy referral?  No medical conditions affecting colonoscopy referral.    Final Referral Details:  Based on patient's medical history patient is appropriate for referral order with MAC/deep sedation.   BMI<= 45 45 < BMI <= 48 48 < BMI < = 50  BMI > 50   No Restrictions No [facility]  No [facility]  [facility] with exceptions Hospital Only OR Only     "

## 2025-07-30 NOTE — LETTER
2025      Natalie Holt  [address] [facility] [facility] [address]              Cherry Estrada,    We hope this message finds you doing well.     Your health is important to us at [facility]. Our records indicate that you could be due, or possibly overdue, for a screening or surveillance colonoscopy if you have not had a colonoscopy since .     An order has been placed for you to have this completed. Our scheduling team will be reaching out to you to assist in getting this scheduled. If you do not hear from them within 7 days or you would like to schedule sooner, please call [facility] Endoscopy [phone]    If you believe this is in error and have already had a colonoscopy done, please have your results and recommendations faxed to [phone].    If you have questions about this order or have further concerns, please reach out to your primary care provider.     Wilder     Colorectal Cancer RN Screening Team  [facility]

## 2025-08-06 ENCOUNTER — TELEPHONE (OUTPATIENT)
Dept: SCHEDULING | Facility: CLINIC | Age: 70
End: 2025-08-06
Payer: MEDICARE

## 2025-08-06 ENCOUNTER — TELEPHONE (OUTPATIENT)
Dept: GASTROENTEROLOGY | Facility: CLINIC | Age: 70
End: 2025-08-06
Payer: MEDICARE

## 2025-08-11 ENCOUNTER — OFFICE VISIT (OUTPATIENT)
Dept: FAMILY MEDICINE | Facility: CLINIC | Age: 70
End: 2025-08-11
Payer: COMMERCIAL

## 2025-08-11 VITALS
HEART RATE: 84 BPM | DIASTOLIC BLOOD PRESSURE: 70 MMHG | OXYGEN SATURATION: 98 % | TEMPERATURE: 98.4 F | SYSTOLIC BLOOD PRESSURE: 120 MMHG | HEIGHT: 64 IN | RESPIRATION RATE: 16 BRPM | BODY MASS INDEX: 42.34 KG/M2 | WEIGHT: 248 LBS

## 2025-08-11 DIAGNOSIS — Z00.00 ENCOUNTER FOR MEDICARE ANNUAL WELLNESS EXAM: Primary | ICD-10-CM

## 2025-08-11 DIAGNOSIS — E03.4 HYPOTHYROIDISM DUE TO ACQUIRED ATROPHY OF THYROID: ICD-10-CM

## 2025-08-11 DIAGNOSIS — E66.813 CLASS 3 SEVERE OBESITY DUE TO EXCESS CALORIES WITH SERIOUS COMORBIDITY AND BODY MASS INDEX (BMI) OF 45.0 TO 49.9 IN ADULT (H): ICD-10-CM

## 2025-08-11 DIAGNOSIS — K42.9 UMBILICAL HERNIA WITHOUT OBSTRUCTION AND WITHOUT GANGRENE: ICD-10-CM

## 2025-08-11 DIAGNOSIS — Z86.0100 HISTORY OF COLONIC POLYPS: ICD-10-CM

## 2025-08-11 DIAGNOSIS — Z23 NEED FOR VACCINATION: ICD-10-CM

## 2025-08-11 DIAGNOSIS — I83.93 VARICOSE VEINS OF BOTH LOWER EXTREMITIES, UNSPECIFIED WHETHER COMPLICATED: ICD-10-CM

## 2025-08-11 DIAGNOSIS — Z12.11 SCREEN FOR COLON CANCER: ICD-10-CM

## 2025-08-11 DIAGNOSIS — Z12.31 VISIT FOR SCREENING MAMMOGRAM: ICD-10-CM

## 2025-08-11 DIAGNOSIS — E66.01 MORBID OBESITY DUE TO EXCESS CALORIES (H): ICD-10-CM

## 2025-08-11 DIAGNOSIS — R79.89 ELEVATED LIVER FUNCTION TESTS: ICD-10-CM

## 2025-08-11 LAB
ALBUMIN SERPL BCG-MCNC: 4.3 G/DL (ref 3.5–5.2)
ALP SERPL-CCNC: 84 U/L (ref 40–150)
ALT SERPL W P-5'-P-CCNC: 17 U/L (ref 0–50)
ANION GAP SERPL CALCULATED.3IONS-SCNC: 17 MMOL/L (ref 7–15)
AST SERPL W P-5'-P-CCNC: 26 U/L (ref 0–45)
BILIRUB SERPL-MCNC: 0.3 MG/DL
BUN SERPL-MCNC: 25.6 MG/DL (ref 8–23)
CALCIUM SERPL-MCNC: 9.3 MG/DL (ref 8.8–10.4)
CHLORIDE SERPL-SCNC: 100 MMOL/L (ref 98–107)
CREAT SERPL-MCNC: 0.62 MG/DL (ref 0.51–0.95)
EGFRCR SERPLBLD CKD-EPI 2021: >90 ML/MIN/1.73M2
GLUCOSE SERPL-MCNC: 107 MG/DL (ref 70–99)
HCO3 SERPL-SCNC: 21 MMOL/L (ref 22–29)
POTASSIUM SERPL-SCNC: 4.6 MMOL/L (ref 3.4–5.3)
PROT SERPL-MCNC: 7.6 G/DL (ref 6.4–8.3)
SODIUM SERPL-SCNC: 138 MMOL/L (ref 135–145)
TSH SERPL DL<=0.005 MIU/L-ACNC: 2.93 UIU/ML (ref 0.3–4.2)

## 2025-08-11 PROCEDURE — 36415 COLL VENOUS BLD VENIPUNCTURE: CPT

## 2025-08-11 PROCEDURE — 99213 OFFICE O/P EST LOW 20 MIN: CPT | Mod: 25

## 2025-08-11 PROCEDURE — G0439 PPPS, SUBSEQ VISIT: HCPCS

## 2025-08-11 PROCEDURE — 84443 ASSAY THYROID STIM HORMONE: CPT

## 2025-08-11 PROCEDURE — 1126F AMNT PAIN NOTED NONE PRSNT: CPT

## 2025-08-11 PROCEDURE — 3074F SYST BP LT 130 MM HG: CPT

## 2025-08-11 PROCEDURE — 3078F DIAST BP <80 MM HG: CPT

## 2025-08-11 PROCEDURE — 80053 COMPREHEN METABOLIC PANEL: CPT

## 2025-08-11 RX ORDER — LEVOTHYROXINE SODIUM 25 UG/1
25 TABLET ORAL DAILY
Qty: 90 TABLET | Refills: 3 | Status: SHIPPED | OUTPATIENT
Start: 2025-08-11

## 2025-08-11 RX ORDER — AMOXICILLIN 500 MG/1
500 CAPSULE ORAL 3 TIMES DAILY
COMMUNITY
Start: 2025-08-06

## 2025-08-11 ASSESSMENT — PATIENT HEALTH QUESTIONNAIRE - PHQ9: SUM OF ALL RESPONSES TO PHQ QUESTIONS 1-9: 0

## 2025-08-11 ASSESSMENT — PAIN SCALES - GENERAL: PAINLEVEL_OUTOF10: NO PAIN (0)

## 2025-08-12 ENCOUNTER — PATIENT OUTREACH (OUTPATIENT)
Dept: CARE COORDINATION | Facility: CLINIC | Age: 70
End: 2025-08-12
Payer: MEDICARE

## 2025-08-12 ENCOUNTER — RESULTS FOLLOW-UP (OUTPATIENT)
Dept: FAMILY MEDICINE | Facility: CLINIC | Age: 70
End: 2025-08-12
Payer: MEDICARE

## 2025-08-14 ENCOUNTER — PATIENT OUTREACH (OUTPATIENT)
Dept: CARE COORDINATION | Facility: CLINIC | Age: 70
End: 2025-08-14
Payer: MEDICARE

## 2025-08-21 ENCOUNTER — OFFICE VISIT (OUTPATIENT)
Dept: SURGERY | Facility: CLINIC | Age: 70
End: 2025-08-21
Payer: MEDICARE

## 2025-08-21 VITALS
SYSTOLIC BLOOD PRESSURE: 135 MMHG | HEIGHT: 64 IN | WEIGHT: 248 LBS | DIASTOLIC BLOOD PRESSURE: 76 MMHG | OXYGEN SATURATION: 95 % | BODY MASS INDEX: 42.34 KG/M2 | HEART RATE: 95 BPM

## 2025-08-21 DIAGNOSIS — K43.2 INCISIONAL HERNIA, WITHOUT OBSTRUCTION OR GANGRENE: Primary | ICD-10-CM

## 2025-08-21 DIAGNOSIS — E66.813 CLASS 3 SEVERE OBESITY DUE TO EXCESS CALORIES WITH SERIOUS COMORBIDITY AND BODY MASS INDEX (BMI) OF 40.0 TO 44.9 IN ADULT (H): ICD-10-CM
